# Patient Record
Sex: FEMALE | Race: OTHER | HISPANIC OR LATINO | URBAN - METROPOLITAN AREA
[De-identification: names, ages, dates, MRNs, and addresses within clinical notes are randomized per-mention and may not be internally consistent; named-entity substitution may affect disease eponyms.]

---

## 2017-08-30 ENCOUNTER — INPATIENT (INPATIENT)
Facility: HOSPITAL | Age: 77
LOS: 1 days | Discharge: HOME | DRG: 313 | End: 2017-09-01
Attending: INTERNAL MEDICINE | Admitting: INTERNAL MEDICINE
Payer: COMMERCIAL

## 2017-08-30 VITALS
RESPIRATION RATE: 18 BRPM | OXYGEN SATURATION: 98 % | SYSTOLIC BLOOD PRESSURE: 103 MMHG | WEIGHT: 136.03 LBS | TEMPERATURE: 99 F | HEIGHT: 60 IN | DIASTOLIC BLOOD PRESSURE: 53 MMHG | HEART RATE: 78 BPM

## 2017-08-30 DIAGNOSIS — Z90.49 ACQUIRED ABSENCE OF OTHER SPECIFIED PARTS OF DIGESTIVE TRACT: Chronic | ICD-10-CM

## 2017-08-30 DIAGNOSIS — R07.9 CHEST PAIN, UNSPECIFIED: ICD-10-CM

## 2017-08-30 LAB
ALBUMIN SERPL ELPH-MCNC: 2.4 G/DL — LOW (ref 3.5–5)
ALP SERPL-CCNC: 72 U/L — SIGNIFICANT CHANGE UP (ref 40–120)
ALT FLD-CCNC: 13 U/L DA — SIGNIFICANT CHANGE UP (ref 10–60)
ANION GAP SERPL CALC-SCNC: 5 MMOL/L — SIGNIFICANT CHANGE UP (ref 5–17)
AST SERPL-CCNC: 16 U/L — SIGNIFICANT CHANGE UP (ref 10–40)
BASOPHILS # BLD AUTO: 0.1 K/UL — SIGNIFICANT CHANGE UP (ref 0–0.2)
BASOPHILS NFR BLD AUTO: 0.9 % — SIGNIFICANT CHANGE UP (ref 0–2)
BILIRUB DIRECT SERPL-MCNC: 0.1 MG/DL — SIGNIFICANT CHANGE UP (ref 0–0.2)
BILIRUB INDIRECT FLD-MCNC: 0.4 MG/DL — SIGNIFICANT CHANGE UP (ref 0.2–1)
BILIRUB SERPL-MCNC: 0.5 MG/DL — SIGNIFICANT CHANGE UP (ref 0.2–1.2)
BUN SERPL-MCNC: 15 MG/DL — SIGNIFICANT CHANGE UP (ref 7–18)
CALCIUM SERPL-MCNC: 8.8 MG/DL — SIGNIFICANT CHANGE UP (ref 8.4–10.5)
CHLORIDE SERPL-SCNC: 100 MMOL/L — SIGNIFICANT CHANGE UP (ref 96–108)
CK MB BLD-MCNC: <1.8 % — SIGNIFICANT CHANGE UP (ref 0–3.5)
CK MB CFR SERPL CALC: <1 NG/ML — SIGNIFICANT CHANGE UP (ref 0–3.6)
CK SERPL-CCNC: 55 U/L — SIGNIFICANT CHANGE UP (ref 21–215)
CO2 SERPL-SCNC: 32 MMOL/L — HIGH (ref 22–31)
CREAT SERPL-MCNC: 0.73 MG/DL — SIGNIFICANT CHANGE UP (ref 0.5–1.3)
EOSINOPHIL # BLD AUTO: 0 K/UL — SIGNIFICANT CHANGE UP (ref 0–0.5)
EOSINOPHIL NFR BLD AUTO: 0.7 % — SIGNIFICANT CHANGE UP (ref 0–6)
GLUCOSE SERPL-MCNC: 146 MG/DL — HIGH (ref 70–99)
HCT VFR BLD CALC: 34.2 % — LOW (ref 34.5–45)
HGB BLD-MCNC: 10.9 G/DL — LOW (ref 11.5–15.5)
LYMPHOCYTES # BLD AUTO: 1.5 K/UL — SIGNIFICANT CHANGE UP (ref 1–3.3)
LYMPHOCYTES # BLD AUTO: 22.6 % — SIGNIFICANT CHANGE UP (ref 13–44)
MCHC RBC-ENTMCNC: 26.7 PG — LOW (ref 27–34)
MCHC RBC-ENTMCNC: 31.9 GM/DL — LOW (ref 32–36)
MCV RBC AUTO: 83.8 FL — SIGNIFICANT CHANGE UP (ref 80–100)
MONOCYTES # BLD AUTO: 0.7 K/UL — SIGNIFICANT CHANGE UP (ref 0–0.9)
MONOCYTES NFR BLD AUTO: 10.1 % — SIGNIFICANT CHANGE UP (ref 2–14)
NEUTROPHILS # BLD AUTO: 4.4 K/UL — SIGNIFICANT CHANGE UP (ref 1.8–7.4)
NEUTROPHILS NFR BLD AUTO: 65.7 % — SIGNIFICANT CHANGE UP (ref 43–77)
PLATELET # BLD AUTO: 204 K/UL — SIGNIFICANT CHANGE UP (ref 150–400)
POTASSIUM SERPL-MCNC: 4 MMOL/L — SIGNIFICANT CHANGE UP (ref 3.5–5.3)
POTASSIUM SERPL-SCNC: 4 MMOL/L — SIGNIFICANT CHANGE UP (ref 3.5–5.3)
PROT SERPL-MCNC: 6.4 G/DL — SIGNIFICANT CHANGE UP (ref 6–8.3)
RBC # BLD: 4.08 M/UL — SIGNIFICANT CHANGE UP (ref 3.8–5.2)
RBC # FLD: 13.6 % — SIGNIFICANT CHANGE UP (ref 10.3–14.5)
SODIUM SERPL-SCNC: 137 MMOL/L — SIGNIFICANT CHANGE UP (ref 135–145)
TROPONIN I SERPL-MCNC: <0.015 NG/ML — SIGNIFICANT CHANGE UP (ref 0–0.04)
WBC # BLD: 6.7 K/UL — SIGNIFICANT CHANGE UP (ref 3.8–10.5)
WBC # FLD AUTO: 6.7 K/UL — SIGNIFICANT CHANGE UP (ref 3.8–10.5)

## 2017-08-30 PROCEDURE — 71275 CT ANGIOGRAPHY CHEST: CPT | Mod: 26

## 2017-08-30 PROCEDURE — 71020: CPT | Mod: 26

## 2017-08-30 PROCEDURE — 99285 EMERGENCY DEPT VISIT HI MDM: CPT

## 2017-08-30 PROCEDURE — 76705 ECHO EXAM OF ABDOMEN: CPT | Mod: 26

## 2017-08-30 RX ORDER — SODIUM CHLORIDE 9 MG/ML
500 INJECTION INTRAMUSCULAR; INTRAVENOUS; SUBCUTANEOUS ONCE
Qty: 0 | Refills: 0 | Status: COMPLETED | OUTPATIENT
Start: 2017-08-30 | End: 2017-08-31

## 2017-08-30 RX ORDER — METOCLOPRAMIDE HCL 10 MG
10 TABLET ORAL ONCE
Qty: 0 | Refills: 0 | Status: COMPLETED | OUTPATIENT
Start: 2017-08-30 | End: 2017-08-30

## 2017-08-30 RX ORDER — ASPIRIN/CALCIUM CARB/MAGNESIUM 324 MG
325 TABLET ORAL ONCE
Qty: 0 | Refills: 0 | Status: COMPLETED | OUTPATIENT
Start: 2017-08-30 | End: 2017-08-30

## 2017-08-30 RX ORDER — SODIUM CHLORIDE 9 MG/ML
1000 INJECTION INTRAMUSCULAR; INTRAVENOUS; SUBCUTANEOUS ONCE
Qty: 0 | Refills: 0 | Status: COMPLETED | OUTPATIENT
Start: 2017-08-30 | End: 2017-08-30

## 2017-08-30 RX ADMIN — Medication 325 MILLIGRAM(S): at 17:32

## 2017-08-30 RX ADMIN — Medication 10 MILLIGRAM(S): at 17:32

## 2017-08-30 RX ADMIN — SODIUM CHLORIDE 2000 MILLILITER(S): 9 INJECTION INTRAMUSCULAR; INTRAVENOUS; SUBCUTANEOUS at 21:31

## 2017-08-30 NOTE — ED ADULT NURSE NOTE - OBJECTIVE STATEMENT
Pt is sent by the clinic  for chest pain , headaches , neck and shoulder pains  from last night. co sob. denies fever and chills

## 2017-08-30 NOTE — ED PROVIDER NOTE - OBJECTIVE STATEMENT
78 y/o F pt w/ PMHx of DM, GERD and HLD presents to ED c/o R shoulder pain radiating to the back, chest and neck since yesterday night. Pt took 3 Aspirin yesterday night and vomited it up. Pt also notes a slight cough. Pt states that she went to an Urgent Care today and was told to present to the ED. Pt denies headache, SOB, or any other complaints. Pt reports no recent trauma. NKDA.

## 2017-08-30 NOTE — ED PROVIDER NOTE - MEDICAL DECISION MAKING DETAILS
Patient with chest pain, back pain since last night, vomit x 2, c/o chest pain still. Normal ekg, trop, CT chest to r/o PE, admit for r/o ACS.

## 2017-08-31 DIAGNOSIS — R07.89 OTHER CHEST PAIN: ICD-10-CM

## 2017-08-31 DIAGNOSIS — Z29.9 ENCOUNTER FOR PROPHYLACTIC MEASURES, UNSPECIFIED: ICD-10-CM

## 2017-08-31 DIAGNOSIS — E11.9 TYPE 2 DIABETES MELLITUS WITHOUT COMPLICATIONS: ICD-10-CM

## 2017-08-31 LAB
CK MB BLD-MCNC: <2 % — SIGNIFICANT CHANGE UP (ref 0–3.5)
CK MB CFR SERPL CALC: <1 NG/ML — SIGNIFICANT CHANGE UP (ref 0–3.6)
CK SERPL-CCNC: 51 U/L — SIGNIFICANT CHANGE UP (ref 21–215)
HBA1C BLD-MCNC: 8 % — HIGH (ref 4–5.6)

## 2017-08-31 RX ORDER — ALENDRONATE SODIUM 70 MG/1
1 TABLET ORAL
Qty: 0 | Refills: 0 | COMMUNITY

## 2017-08-31 RX ORDER — ASPIRIN/CALCIUM CARB/MAGNESIUM 324 MG
81 TABLET ORAL DAILY
Qty: 0 | Refills: 0 | Status: DISCONTINUED | OUTPATIENT
Start: 2017-08-31 | End: 2017-09-01

## 2017-08-31 RX ORDER — ENOXAPARIN SODIUM 100 MG/ML
40 INJECTION SUBCUTANEOUS DAILY
Qty: 0 | Refills: 0 | Status: DISCONTINUED | OUTPATIENT
Start: 2017-08-31 | End: 2017-09-01

## 2017-08-31 RX ORDER — METFORMIN HYDROCHLORIDE 850 MG/1
1 TABLET ORAL
Qty: 0 | Refills: 0 | COMMUNITY

## 2017-08-31 RX ORDER — ATORVASTATIN CALCIUM 80 MG/1
40 TABLET, FILM COATED ORAL AT BEDTIME
Qty: 0 | Refills: 0 | Status: DISCONTINUED | OUTPATIENT
Start: 2017-08-31 | End: 2017-09-01

## 2017-08-31 RX ORDER — PREGABALIN 225 MG/1
1 CAPSULE ORAL
Qty: 0 | Refills: 0 | COMMUNITY

## 2017-08-31 RX ORDER — INSULIN LISPRO 100/ML
VIAL (ML) SUBCUTANEOUS
Qty: 0 | Refills: 0 | Status: DISCONTINUED | OUTPATIENT
Start: 2017-08-31 | End: 2017-09-01

## 2017-08-31 RX ORDER — GLIMEPIRIDE 1 MG
1 TABLET ORAL
Qty: 0 | Refills: 0 | COMMUNITY

## 2017-08-31 RX ADMIN — SODIUM CHLORIDE 500 MILLILITER(S): 9 INJECTION INTRAMUSCULAR; INTRAVENOUS; SUBCUTANEOUS at 00:32

## 2017-08-31 RX ADMIN — ATORVASTATIN CALCIUM 40 MILLIGRAM(S): 80 TABLET, FILM COATED ORAL at 21:57

## 2017-08-31 RX ADMIN — Medication 2: at 17:19

## 2017-08-31 RX ADMIN — Medication 81 MILLIGRAM(S): at 12:06

## 2017-08-31 RX ADMIN — ENOXAPARIN SODIUM 40 MILLIGRAM(S): 100 INJECTION SUBCUTANEOUS at 12:06

## 2017-08-31 RX ADMIN — Medication 1: at 12:05

## 2017-08-31 NOTE — H&P ADULT - NSHPLABSRESULTS_GEN_ALL_CORE
Vital Signs Last 24 Hrs  T(C): 36.6 (30 Aug 2017 23:55), Max: 37.2 (30 Aug 2017 16:37)  T(F): 97.9 (30 Aug 2017 23:55), Max: 98.9 (30 Aug 2017 16:37)  HR: 59 (30 Aug 2017 23:55) (59 - 78)  BP: 108/43 (30 Aug 2017 23:55) (94/50 - 108/43)  BP(mean): --  RR: 17 (30 Aug 2017 23:55) (17 - 18)  SpO2: 96% (30 Aug 2017 23:55) (96% - 98%)    - Xray Chest 2 Views PA/Lat (08.30.17 @ 17:41) >  Clear lungs. No mediastinal widening.     -CT Angio Chest w/ IV Cont (08.30.17 @ 19:58) >  No evidence for pulmonary embolism.  Cardiomegaly and small pericardial effusion.  Partially visualized upper abdomen demonstrates markedly dilated common   bile duct with intrahepatic bile duct dilatation. LFT correlation is   recommended and dedicated CT of the abdomen if not recently performed.    - US Hepatic & Pancreatic (08.30.17 @ 21:12) >  Dilated common bile duct reflecting postcholecystectomy state. Recommend   correlation with LFT and additional imaging (MRCP/MRI) if there is   clinical suspicion for choledocholithiasis. Vital Signs Last 24 Hrs  T(C): 36.6 (30 Aug 2017 23:55), Max: 37.2 (30 Aug 2017 16:37)  T(F): 97.9 (30 Aug 2017 23:55), Max: 98.9 (30 Aug 2017 16:37)  HR: 59 (30 Aug 2017 23:55) (59 - 78)  BP: 108/43 (30 Aug 2017 23:55) (94/50 - 108/43)  RR: 17 (30 Aug 2017 23:55) (17 - 18)  SpO2: 96% (30 Aug 2017 23:55) (96% - 98%)    - Xray Chest 2 Views PA/Lat (08.30.17 @ 17:41) >  Clear lungs. No mediastinal widening.     -CT Angio Chest w/ IV Cont (08.30.17 @ 19:58) >  No evidence for pulmonary embolism.  Cardiomegaly and small pericardial effusion.  Partially visualized upper abdomen demonstrates markedly dilated common   bile duct with intrahepatic bile duct dilatation. LFT correlation is   recommended and dedicated CT of the abdomen if not recently performed.    - US Hepatic & Pancreatic (08.30.17 @ 21:12) >  Dilated common bile duct reflecting postcholecystectomy state. Recommend   correlation with LFT and additional imaging (MRCP/MRI) if there is   clinical suspicion for choledocholithiasis.

## 2017-08-31 NOTE — CONSULT NOTE ADULT - SUBJECTIVE AND OBJECTIVE BOX
HISTORY OF PRESENT ILLNESS: HPI:  77 year old Female from home, ambulates independently, (daughter at beside interpreting) with PMH of diabetes, HLD??(patient stopped taking simvastatin in 2016) , osteoporosis??( patient takes alendronate but does not know why?) came with c/o Rt, shoulder pain, Chest pain and SOB x yesterday. As per the patient, She was coming out from car to go to her home when she suddenly developed Rt shoulder pain which then radiated to her chest and back of neck. Pain was sudden in onset, 9/10 in intensity, rodolfo in nature, with no aggravating/ alleviating factors, no changes with movement, associated with SOB, coughing, nausea, vomited 2 times which contains yellow fluid. She denies dizziness, diaphoresis, trauma, anxiety, stress, nasal congestion, hx of URI, orthopnea, Pedal edema, palpitations, heartburn, weight loss, abdominal pain, urinary or Bowel complains. She then went to her PCP Dr. Mau Horton (ph # [phone]) who referred the patient to hospital.  Pain is relieved in ED after she got reglan.  Patient also denies hx of GERD or gastritis, Her symptoms are not clearly exertional.  Symptoms currently resolved    In ED: Patient was given Reglan, aspirin, EKG was normal NSR, No ST-T wave changes. Troponin x 3 normal. When Patient was seen by me, She was asymptomatic. (31 Aug 2017 01:25)      PAST MEDICAL & SURGICAL HISTORY:  DM (diabetes mellitus)  S/P appendectomy  History of cholecystectomy          MEDICATIONS:  MEDICATIONS  (STANDING):  insulin lispro (HumaLOG) corrective regimen sliding scale   SubCutaneous three times a day before meals  enoxaparin Injectable 40 milliGRAM(s) SubCutaneous daily  atorvastatin 40 milliGRAM(s) Oral at bedtime  aspirin  chewable 81 milliGRAM(s) Oral daily      Allergies    penicillin (Unknown)    Intolerances        FAMILY HISTORY:  Family history of lymphoma (Sibling)  Family history of diabetes mellitus (DM) (Sibling): Brother has diabetes    Non-contributary for premature coronary disease or sudden cardiac death    SOCIAL HISTORY:    [X ] Non-smoker  [ ] Smoker  [ ] Alcohol      REVIEW OF SYSTEMS:  [ X]chest pain  [  ]shortness of breath  [  ]palpitations  [  ]syncope  [ ]near syncope [ ]upper extremity weakness   [ ] lower extremity weakness  [  ]diplopia  [  ]altered mental status   [  ]fevers  [ ]chills [ ]nausea  [ ]vomitting  [  ]dysphagia    [ ]abdominal pain  [ ]melena  [ ]BRBPR    [  ]epistaxis  [  ]rash    [ ]lower extremity edema        [ X] All others negative	  [ ] Unable to obtain    PHYSICAL EXAM:  T(C): 36.7 (08-31-17 @ 07:20), Max: 37.2 (08-30-17 @ 16:37)  HR: 65 (08-31-17 @ 07:20) (59 - 78)  BP: 110/49 (08-31-17 @ 07:20) (94/50 - 110/49)  RR: 16 (08-31-17 @ 07:20) (16 - 18)  SpO2: 95% (08-31-17 @ 07:20) (95% - 98%)  Wt(kg): --  I&O's Summary    31 Aug 2017 07:01  -  31 Aug 2017 10:44  --------------------------------------------------------  IN: 200 mL / OUT: 0 mL / NET: 200 mL          HEENT:   Normal oral mucosa, PERRL, EOMI	  Lymphatic: No obvious lymphadenopathy , no edema  Cardiovascular: Normal S1 S2, No JVD,  1/6 KAPIL murmur , Peripheral pulses palpable 2+ bilaterally  Respiratory: Lungs clear to auscultation, normal effort 	  Gastrointestinal:  Soft, Non-tender, + BS	  Skin: No rashes, No cyanosis, warm to touch  Musculoskeletal: Normal range of motion, normal strength  Psychiatry:  Appropriate Mood & affect     TELEMETRY: 	  Sinus  ECG:  	Sinus 73 BPM, possible LAE  RADIOLOGY:       CXR: (-) infiltrates    	  	  LABS:	 	    CARDIAC MARKERS:  CARDIAC MARKERS ( 30 Aug 2017 23:20 )  <0.015 ng/mL / x     / 51 U/L / x     / <1.0 ng/mL  CARDIAC MARKERS ( 30 Aug 2017 20:48 )  <0.015 ng/mL / x     / x     / x     / x      CARDIAC MARKERS ( 30 Aug 2017 17:28 )  <0.015 ng/mL / x     / 55 U/L / x     / <1.0 ng/mL                              10.9   6.7   )-----------( 204      ( 30 Aug 2017 17:28 )             34.2     Hb Trend: 10.9<--    08-30    137  |  100  |  15  ----------------------------<  146<H>  4.0   |  32<H>  |  0.73    Ca    8.8      30 Aug 2017 17:28    TPro  6.4  /  Alb  2.4<L>  /  TBili  0.5  /  DBili  0.1  /  AST  16  /  ALT  13  /  AlkPhos  72  08-30    Creatinine Trend: 0.73<--    Coags:      proBNP:   Lipid Profile:   HgA1c: Hemoglobin A1C, Whole Blood: 8.0 % (08-31 @ 10:13)    TSH:     ASSESSMENT/PLAN: 	77y Female DM, admitted with atypical CP r/o for MI    - Prelim echo with small pericardial effusion, check TSH  - F/u official echo report  - F/u nuclear stress test today    I once again thank you for allowing me to participate in the care of your patient.  If you have any questions or concerns please do not hesitate to contact me.    Talat Velasco MD, FACC  [facility]  [address] [place] [address]  PHONE:  [phone]  BEEPER : [phone]

## 2017-08-31 NOTE — H&P ADULT - PROBLEM SELECTOR PLAN 2
FS: 110  -Patient take metformin, insulin and glimepride  -Will start HSS, c/w accucheck FS: 110  -Patient take metformin, insulin and glimepride at home  -Will start HSS, c/w accucheck  -f/u HbA1C FS: 110  -Patient take metformin, insulin and glimepride at home  -Will start HSS, c/w accucheck  -f/u HbA1C  -Patient doesn't remember dose of insulin degludec, as per ED it is 16U,  Primary medical team please call pharmacy in morning (ph# [phone]) to confirm medications

## 2017-08-31 NOTE — H&P ADULT - FAMILY HISTORY
Sibling  Still living? Unknown  Family history of diabetes mellitus (DM), Age at diagnosis: Age Unknown  Family history of lymphoma, Age at diagnosis: Age Unknown

## 2017-08-31 NOTE — H&P ADULT - ASSESSMENT
77 year old Female from home, ambulates independently, (daughter at beside interpreting) with PMH of diabetes came with c/o Rt, shoulder pain, Chest pain and SOB x yesterday. As per the patient, She was coming out from car to go to her home when she suddenly developed Rt shoulder pain which then radiated to her chest and back of neck. Pain was sudden in onset, 9/10 in intensity, rodolfo in nature, with no aggravating/ alleviating factors, no changes with movement, associated with SOB, coughing, nausea, vomited 2 times which contains yellow fluid. She denies dizziness, diaphoresis, trauma, anxiety, stress, nasal congestion, hx of URI, orthopnea, Pedal edema, palpitations, heartburn, weight loss, abdominal pain, urinary or Bowel complains. She then went to her PCP Dr. Mau Horton (ph # [phone]) who referred the patient to hospital.  Pain is relieved in ED after she got reglan.  Patient also denies hx of GERD or gastritis,     In ED: Patient was given Reglan, aspirin, EKG was normal NSR, No ST-T wave changes. Troponin x 3 normal. When Patient was seen by me, She was asymptomatic.  Given the patient history of diabetes and age, patient is admitted to Telemetry floor to R/o MI/ arrythmias.

## 2017-08-31 NOTE — H&P ADULT - PROBLEM SELECTOR PLAN 1
Atypical chest pain, R/O MI/ ACS: EKG: NSR, NS-T wave changes, Troponin x 3 negative., Heart score: Moderate- 4, ELINA: 1 point( 5% all cause mortality risk),   -Patient states she was on simvastatin till 2016 but her PCP discontinued her  -PE ruled out: CT angio normal,  -CXR normal  --F/u Echo, F/u Lipid profile Atypical chest pain, Given patient hx of diabetes will r/o MI/ ACS: EKG: NSR, NS-T wave changes, Troponin x 3 negative., Heart score: Moderate- 4, ELINA: 1 point( 5% all cause mortality risk), PE ruled out: CT angio normal, CXR normal  -Patient states she was on simvastatin till 2016 as her PCP asked to stop the medication  -will start aspirin, atorvastatin   -f/u lipid, f/u Echo Atypical chest pain, Given patient hx of diabetes will r/o MI/ ACS: EKG: NSR, NS-T wave changes, Troponin x 3 negative., Heart score: Moderate- 4, ELINA: 1 point( 5% all cause mortality risk), PE ruled out: CT angio normal, CXR normal  -Patient states she was on simvastatin till 2016 but her PCP asked to stop the medication  -will start aspirin, atorvastatin   -f/u lipid, f/u Echo Atypical chest pain, Given patient hx of diabetes will r/o MI/ ACS: EKG: NSR, NS-T wave changes, Troponin x 3 negative.,  Heart score: Moderate- 4, ELINA: 1 point( 5% all cause mortality risk), PE ruled out: CT angio normal, Partially visualized upper abdomen demonstrates markedly dilated common bile duct with intrahepatic bile duct dilatation. could be due to postcholecytectomy finding"  CXR normal  -Patient states she was on simvastatin till 2016 but her PCP asked to stop the medication  -will start aspirin, atorvastatin, not starting lopressor because of low BP  -f/u lipid, f/u Echo Atypical chest pain, Given patient hx of diabetes will r/o MI/ ACS: EKG: NSR, NS-T wave changes, Troponin x 3 negative.,  Heart score: Moderate- 4, ELINA: 1 point( 5% all cause mortality risk), PE ruled out: CT angio normal, Partially visualized upper abdomen demonstrates markedly dilated common bile duct with intrahepatic bile duct dilatation. could be due to postcholecytectomy finding"  CXR normal  -Patient states she was on simvastatin till 2016 but her PCP asked to stop the medication  -will start aspirin, atorvastatin, not starting lopressor because of low BP  -f/u lipid, f/u Echo  -Cardio consult:

## 2017-08-31 NOTE — H&P ADULT - HISTORY OF PRESENT ILLNESS
77 year old Female from home, ambulates independently, (daughter at beside interpreting) with PMH of diabetes came with c/o Rt, shoulder pain, Chest pain and SOB x yesterday. As per the patient, She was coming out from car to go to her home when she suddenly developed Rt shoulder pain which then radiated to her chest and back of neck. Pain was sudden in onset, 9/10 in intensity, rodolfo in nature, with no aggravating/ alleviating factors, no changes with movement, associated with SOB, coughing, nausea, vomited 2 times which contains yellow fluid. She denies dizziness, diaphoresis, trauma, anxiety, stress, nasal congestion, hx of URI, orthopnea, Pedal edema, palpitations, heartburn, weight loss, abdominal pain, urinary or Bowel complains. She then went to her PCP Dr. Mau Horton (ph # [phone]) who referred the patient to hospital.  Pain is relieved in ED after she got reglan.  Patient also denies hx of GERD or gastritis,     In ED: Patient was given Reglan, aspirin, EKG was normal NSR, No ST-T wave changes. Troponin x 3 normal. When Patient was seen by me, She was asymptomatic. 77 year old Female from home, ambulates independently, (daughter at beside interpreting) with PMH of diabetes, HLD??(patient stopped taking simvastatin in 2016) , osteoporosis??( patient takes alendronate but does not know why?) came with c/o Rt, shoulder pain, Chest pain and SOB x yesterday. As per the patient, She was coming out from car to go to her home when she suddenly developed Rt shoulder pain which then radiated to her chest and back of neck. Pain was sudden in onset, 9/10 in intensity, rodolfo in nature, with no aggravating/ alleviating factors, no changes with movement, associated with SOB, coughing, nausea, vomited 2 times which contains yellow fluid. She denies dizziness, diaphoresis, trauma, anxiety, stress, nasal congestion, hx of URI, orthopnea, Pedal edema, palpitations, heartburn, weight loss, abdominal pain, urinary or Bowel complains. She then went to her PCP Dr. Mau Horton (ph # [phone]) who referred the patient to hospital.  Pain is relieved in ED after she got reglan.  Patient also denies hx of GERD or gastritis,     In ED: Patient was given Reglan, aspirin, EKG was normal NSR, No ST-T wave changes. Troponin x 3 normal. When Patient was seen by me, She was asymptomatic.

## 2017-08-31 NOTE — H&P ADULT - PROBLEM SELECTOR PLAN 3
IMPROVE score: 2 will start lovenox Hb10.9 MCV and RDW nl.  f/u anemia panel Hb10.9 MCV and RDW nl. Patient also takes cyanocobalamine injections.  f/u B12,Folic acid  f/u anemia panel

## 2017-08-31 NOTE — H&P ADULT - ATTENDING COMMENTS
Patient seen and examined; Agree with PGY1 A/P above with editing as needed.    77 year old Female from home, ambulates independently, with PMH of diabetes, HLD??(patient stopped taking simvastatin in 2016) , osteoporosis??( patient takes alendronate but does not know why?) came with c/o Rt, shoulder pain, Chest pain and SOB x yesterday. As per the patient, She was coming out from car to go to her home when she suddenly developed Rt shoulder pain which then radiated to her chest and back of neck. Pain was sudden in onset, 9/10 in intensity, rodolfo in nature, with no aggravating/ alleviating factors, no changes with movement, associated with SOB, coughing, nausea, vomited 2 times which contains yellow fluid. She denies dizziness, diaphoresis, trauma, anxiety, stress, nasal congestion, hx of URI, orthopnea, Pedal edema, palpitations, heartburn, weight loss, abdominal pain, urinary or Bowel complains. She then went to her PCP Dr. Mau Horton (ph # [phone]) who referred the patient to hospital.  Pain is relieved in ED after she got reglan.  Patient also denies hx of GERD or gastritis,     Patient feels better this morning; symptoms resolved; No further vomiting; Ate lunch; tolerated; Other ROS negative    FH: DM (Son); No HTN or stroke or kidney problem  SH: Non smoker; No Alcohol; Lives with daughter      Vital Signs Last 24 Hrs: Stable  T(C): 37.2 (31 Aug 2017 11:35), Max: 37.2 (30 Aug 2017 16:37)  T(F): 98.9 (31 Aug 2017 11:35), Max: 98.9 (30 Aug 2017 16:37)  HR: 72 (31 Aug 2017 11:35) (59 - 78)  BP: 123/42 (31 Aug 2017 11:35) (94/50 - 123/42)  RR: 16 (31 Aug 2017 11:35) (16 - 18)  SpO2: 98% (31 Aug 2017 11:35) (95% - 98%)    P/E: As above; Clinically NAD    labs: Reviewed; WNL     CT Angio Chest w/ IV Cont (08.30.17 @ 19:58)     IMPRESSION:     No evidence for pulmonary embolism.    Cardiomegaly and small pericardial effusion.    Partially visualized upper abdomen demonstrates markedly dilated common bile duct with intrahepatic bile duct dilatation. LFT correlation is   recommended and dedicated CT of the abdomen if not recently performed.    D/D:  Chest pain concern for ACS/ ischemic heart disease given risk factors (DM, age)  VTE ruled out  Possible Esophagitis sec. to Bisphosphonate  Evaluate for heart failure given cardiomegaly    Plan:  Admit to tele; Echo f/u results; Ecotrin  Serial troponin negative;   Cardio consult appreciated; F/U Stress and Echo results  Hold anti HTN meds due to low normal BP Patient seen and examined; Agree with PGY1 A/P above with editing as needed.    77 year old Female from home, ambulates independently, with PMH of diabetes, HLD??(patient stopped taking simvastatin in 2016) , osteoporosis??( patient takes alendronate but does not know why?) came with c/o Rt, shoulder pain, Chest pain and SOB x yesterday. As per the patient, She was coming out from car to go to her home when she suddenly developed Rt shoulder pain which then radiated to her chest and back of neck. Pain was sudden in onset, 9/10 in intensity, rodolfo in nature, with no aggravating/ alleviating factors, no changes with movement, associated with SOB, coughing, nausea, vomited 2 times which contains yellow fluid. She denies dizziness, diaphoresis, trauma, anxiety, stress, nasal congestion, hx of URI, orthopnea, Pedal edema, palpitations, heartburn, weight loss, abdominal pain, urinary or Bowel complains. She then went to her PCP Dr. Mau Horton (ph # [phone]) who referred the patient to hospital.  Pain is relieved in ED after she got reglan.  Patient also denies hx of GERD or gastritis,     Patient feels better this morning; symptoms resolved; No further vomiting; Ate lunch; tolerated; Other ROS negative    FH: DM (Son); No HTN or stroke or kidney problem  SH: Non smoker; No Alcohol; Lives with daughter      Vital Signs Last 24 Hrs: Stable  T(C): 37.2 (31 Aug 2017 11:35), Max: 37.2 (30 Aug 2017 16:37)  T(F): 98.9 (31 Aug 2017 11:35), Max: 98.9 (30 Aug 2017 16:37)  HR: 72 (31 Aug 2017 11:35) (59 - 78)  BP: 123/42 (31 Aug 2017 11:35) (94/50 - 123/42)  RR: 16 (31 Aug 2017 11:35) (16 - 18)  SpO2: 98% (31 Aug 2017 11:35) (95% - 98%)    P/E: As above; Clinically NAD    labs: Reviewed; WNL     CT Angio Chest w/ IV Cont (08.30.17 @ 19:58)     IMPRESSION:     No evidence for pulmonary embolism.    Cardiomegaly and small pericardial effusion.    Partially visualized upper abdomen demonstrates markedly dilated common bile duct with intrahepatic bile duct dilatation. LFT correlation is   recommended and dedicated CT of the abdomen if not recently performed.    D/D:  Chest pain concern for ACS/ ischemic heart disease given risk factors (DM, age)  VTE ruled out  Possible Esophagitis sec. to Bisphosphonate  Evaluate for heart failure given cardiomegaly  Uncontrolled DM  Anemia chronic likely    Plan:  Admit to tele; Echo f/u results; Ecotrin  Serial troponin negative;   Cardio consult appreciated; F/U Stress and Echo results  Hold anti HTN meds due to low normal BP  Needs better control of DM  will give PPI for possible Esophagitis    Discussed with patient and family at bedside findings and plan of care  Discussed with MAR last night

## 2017-09-01 ENCOUNTER — TRANSCRIPTION ENCOUNTER (OUTPATIENT)
Age: 77
End: 2017-09-01

## 2017-09-01 VITALS
HEART RATE: 65 BPM | SYSTOLIC BLOOD PRESSURE: 119 MMHG | DIASTOLIC BLOOD PRESSURE: 36 MMHG | RESPIRATION RATE: 16 BRPM | TEMPERATURE: 99 F | OXYGEN SATURATION: 96 %

## 2017-09-01 LAB
ANION GAP SERPL CALC-SCNC: 9 MMOL/L — SIGNIFICANT CHANGE UP (ref 5–17)
BUN SERPL-MCNC: 9 MG/DL — SIGNIFICANT CHANGE UP (ref 7–18)
CALCIUM SERPL-MCNC: 9.2 MG/DL — SIGNIFICANT CHANGE UP (ref 8.4–10.5)
CHLORIDE SERPL-SCNC: 104 MMOL/L — SIGNIFICANT CHANGE UP (ref 96–108)
CHOLEST SERPL-MCNC: 190 MG/DL — SIGNIFICANT CHANGE UP (ref 10–199)
CO2 SERPL-SCNC: 28 MMOL/L — SIGNIFICANT CHANGE UP (ref 22–31)
CREAT SERPL-MCNC: 0.74 MG/DL — SIGNIFICANT CHANGE UP (ref 0.5–1.3)
FERRITIN SERPL-MCNC: 159 NG/ML — HIGH (ref 15–150)
GLUCOSE SERPL-MCNC: 191 MG/DL — HIGH (ref 70–99)
HBA1C BLD-MCNC: 8 % — HIGH (ref 4–5.6)
HCT VFR BLD CALC: 37.3 % — SIGNIFICANT CHANGE UP (ref 34.5–45)
HDLC SERPL-MCNC: 69 MG/DL — SIGNIFICANT CHANGE UP (ref 40–125)
HGB BLD-MCNC: 12.5 G/DL — SIGNIFICANT CHANGE UP (ref 11.5–15.5)
IRON SATN MFR SERPL: 15 % — SIGNIFICANT CHANGE UP (ref 15–50)
IRON SATN MFR SERPL: 43 UG/DL — SIGNIFICANT CHANGE UP (ref 40–150)
LIPID PNL WITH DIRECT LDL SERPL: 102 MG/DL — SIGNIFICANT CHANGE UP
MAGNESIUM SERPL-MCNC: 2 MG/DL — SIGNIFICANT CHANGE UP (ref 1.6–2.6)
MCHC RBC-ENTMCNC: 28.8 PG — SIGNIFICANT CHANGE UP (ref 27–34)
MCHC RBC-ENTMCNC: 33.5 GM/DL — SIGNIFICANT CHANGE UP (ref 32–36)
MCV RBC AUTO: 86 FL — SIGNIFICANT CHANGE UP (ref 80–100)
PHOSPHATE SERPL-MCNC: 2.7 MG/DL — SIGNIFICANT CHANGE UP (ref 2.5–4.5)
PLATELET # BLD AUTO: 229 K/UL — SIGNIFICANT CHANGE UP (ref 150–400)
POTASSIUM SERPL-MCNC: 3.8 MMOL/L — SIGNIFICANT CHANGE UP (ref 3.5–5.3)
POTASSIUM SERPL-SCNC: 3.8 MMOL/L — SIGNIFICANT CHANGE UP (ref 3.5–5.3)
RBC # BLD: 4.34 M/UL — SIGNIFICANT CHANGE UP (ref 3.8–5.2)
RBC # FLD: 13.6 % — SIGNIFICANT CHANGE UP (ref 10.3–14.5)
SODIUM SERPL-SCNC: 141 MMOL/L — SIGNIFICANT CHANGE UP (ref 135–145)
TIBC SERPL-MCNC: 283 UG/DL — SIGNIFICANT CHANGE UP (ref 250–450)
TOTAL CHOLESTEROL/HDL RATIO MEASUREMENT: 2.8 RATIO — LOW (ref 3.3–7.1)
TRANSFERRIN SERPL-MCNC: 235 MG/DL — SIGNIFICANT CHANGE UP (ref 200–360)
TRIGL SERPL-MCNC: 97 MG/DL — SIGNIFICANT CHANGE UP (ref 10–149)
TSH SERPL-MCNC: 1.73 UU/ML — SIGNIFICANT CHANGE UP (ref 0.34–4.82)
UIBC SERPL-MCNC: 240 UG/DL — SIGNIFICANT CHANGE UP (ref 110–370)
VIT B12 SERPL-MCNC: 425 PG/ML — SIGNIFICANT CHANGE UP (ref 243–894)
WBC # BLD: 6.2 K/UL — SIGNIFICANT CHANGE UP (ref 3.8–10.5)
WBC # FLD AUTO: 6.2 K/UL — SIGNIFICANT CHANGE UP (ref 3.8–10.5)

## 2017-09-01 RX ORDER — ASPIRIN/CALCIUM CARB/MAGNESIUM 324 MG
1 TABLET ORAL
Qty: 30 | Refills: 0 | OUTPATIENT
Start: 2017-09-01 | End: 2017-10-01

## 2017-09-01 RX ORDER — INSULIN DEGLUDEC 100 U/ML
15 INJECTION, SOLUTION SUBCUTANEOUS
Qty: 0 | Refills: 0 | COMMUNITY

## 2017-09-01 RX ORDER — ATORVASTATIN CALCIUM 80 MG/1
1 TABLET, FILM COATED ORAL
Qty: 30 | Refills: 0 | OUTPATIENT
Start: 2017-09-01 | End: 2017-10-01

## 2017-09-01 RX ORDER — INSULIN DEGLUDEC 100 U/ML
16 INJECTION, SOLUTION SUBCUTANEOUS
Qty: 0 | Refills: 0 | COMMUNITY

## 2017-09-01 RX ORDER — INSULIN DEGLUDEC 100 U/ML
20 INJECTION, SOLUTION SUBCUTANEOUS
Qty: 0 | Refills: 0 | COMMUNITY

## 2017-09-01 RX ADMIN — ENOXAPARIN SODIUM 40 MILLIGRAM(S): 100 INJECTION SUBCUTANEOUS at 12:00

## 2017-09-01 RX ADMIN — Medication 81 MILLIGRAM(S): at 12:00

## 2017-09-01 RX ADMIN — Medication 3: at 12:00

## 2017-09-01 RX ADMIN — Medication 1: at 08:11

## 2017-09-01 NOTE — DISCHARGE NOTE ADULT - MEDICATION SUMMARY - MEDICATIONS TO TAKE
I will START or STAY ON the medications listed below when I get home from the hospital:    aspirin 81 mg oral tablet, chewable  -- 1 tab(s) by mouth once a day  -- Indication: For Cardioprotective    insulin degludec 100 units/mL subcutaneous solution  -- 20 unit(s) subcutaneous once a day (at bedtime)  -- Indication: For DM (diabetes mellitus)    glimepiride 4 mg oral tablet  -- 1 tab(s) by mouth once a day  -- Indication: For DM (diabetes mellitus)    metFORMIN 850 mg oral tablet  -- 1 tab(s) by mouth 2 times a day (with meals)  -- Indication: For DM (diabetes mellitus)    atorvastatin 40 mg oral tablet  -- 1 tab(s) by mouth once a day (at bedtime)  -- Indication: For Hld    alendronate 70 mg oral tablet  -- 1 tab(s) by mouth once a week  -- Indication: For osteoporosis    cyanocobalamin 1000 mcg/mL injectable solution  -- 1 milliliter(s) injectable once a month  -- Indication: For vitmain defieciency I will START or STAY ON the medications listed below when I get home from the hospital:    aspirin 81 mg oral tablet, chewable  -- 1 tab(s) by mouth once a day  -- Indication: For Cardioprotective     glimepiride 4 mg oral tablet  -- 1 tab(s) by mouth once a day  -- Indication: For DM (diabetes mellitus)    metFORMIN 850 mg oral tablet  -- 1 tab(s) by mouth 2 times a day (with meals)  -- Indication: For DM (diabetes mellitus)    insulin degludec 100 units/mL subcutaneous solution  -- 20 unit(s) subcutaneous once a day (at bedtime)  -- Indication: For DM (diabetes mellitus)    alendronate 70 mg oral tablet  -- 1 tab(s) by mouth once a week  -- Indication: For osteoporosis    cyanocobalamin 1000 mcg/mL injectable solution  -- 1 milliliter(s) injectable once a month  -- Indication: For vitmain defieciency I will START or STAY ON the medications listed below when I get home from the hospital:    aspirin 81 mg oral tablet, chewable  -- 1 tab(s) by mouth once a day  -- Indication: For Cardioprotective     insulin degludec 100 units/mL subcutaneous solution  -- 15 unit(s) subcutaneous once a day (at bedtime)  -- Indication: For DM (diabetes mellitus)    glimepiride 4 mg oral tablet  -- 1 tab(s) by mouth once a day  -- Indication: For DM (diabetes mellitus)    metFORMIN 850 mg oral tablet  -- 1 tab(s) by mouth 2 times a day (with meals)  -- Indication: For DM (diabetes mellitus)    atorvastatin 20 mg oral tablet  -- 1 tab(s) by mouth once a day  -- Avoid grapefruit and grapefruit juice while taking this medication.  Do not take this drug if you are pregnant.  It is very important that you take or use this exactly as directed.  Do not skip doses or discontinue unless directed by your doctor.  Obtain medical advice before taking any non-prescription drugs as some may affect the action of this medication.  Take with food or milk.    -- Indication: For Cardioprotective    alendronate 70 mg oral tablet  -- 1 tab(s) by mouth once a week  -- Indication: For osteoporosis    cyanocobalamin 1000 mcg/mL injectable solution  -- 1 milliliter(s) injectable once a month  -- Indication: For vitmain defieciency I will START or STAY ON the medications listed below when I get home from the hospital:    aspirin 81 mg oral tablet, chewable  -- 1 tab(s) by mouth once a day  -- Indication: For Cardioprotective     insulin degludec 100 units/mL subcutaneous solution  -- 15 unit(s) subcutaneous once a day (at bedtime)  -- Indication: For DM (diabetes mellitus)    glimepiride 4 mg oral tablet  -- 1 tab(s) by mouth once a day  -- Indication: For DM (diabetes mellitus)    metFORMIN 850 mg oral tablet  -- 1 tab(s) by mouth 2 times a day (with meals)  -- Indication: For DM (diabetes mellitus)    atorvastatin 10 mg oral tablet  -- 1 tab(s) by mouth once a day at bedtime  -- Avoid grapefruit and grapefruit juice while taking this medication.  Do not take this drug if you are pregnant.  It is very important that you take or use this exactly as directed.  Do not skip doses or discontinue unless directed by your doctor.  Obtain medical advice before taking any non-prescription drugs as some may affect the action of this medication.  Take with food or milk.    -- Indication: For Prevent heart disease    alendronate 70 mg oral tablet  -- 1 tab(s) by mouth once a week  -- Indication: For osteoporosis    cyanocobalamin 1000 mcg/mL injectable solution  -- 1 milliliter(s) injectable once a month  -- Indication: For vitmain defieciency I will START or STAY ON the medications listed below when I get home from the hospital:    aspirin 81 mg oral tablet, chewable  -- 1 tab(s) by mouth once a day  -- Indication: For Cardioproptective    glimepiride 4 mg oral tablet  -- 1 tab(s) by mouth once a day  -- Indication: For DM (diabetes mellitus)    metFORMIN 850 mg oral tablet  -- 1 tab(s) by mouth 2 times a day (with meals)  -- Indication: For DM (diabetes mellitus)    insulin degludec 100 units/mL subcutaneous solution  -- 15 unit(s) subcutaneous once a day (at bedtime)  -- Indication: For DM (diabetes mellitus)    atorvastatin 10 mg oral tablet  -- 1 tab(s) by mouth once a day at bedtime  -- Avoid grapefruit and grapefruit juice while taking this medication.  Do not take this drug if you are pregnant.  It is very important that you take or use this exactly as directed.  Do not skip doses or discontinue unless directed by your doctor.  Obtain medical advice before taking any non-prescription drugs as some may affect the action of this medication.  Take with food or milk.    -- Indication: For Cardioprotective    alendronate 70 mg oral tablet  -- 1 tab(s) by mouth once a week  -- Indication: For osteoporosis    cyanocobalamin 1000 mcg/mL injectable solution  -- 1 milliliter(s) injectable once a month  -- Indication: For vitmain defieciency

## 2017-09-01 NOTE — DISCHARGE NOTE ADULT - PLAN OF CARE
resolution of symptoms c/w asa   c/w home meds   Please c/w low ch diet   Follow up with pmd in 2 weeks c/w home meds  c/w Increased dose to 20 at bedtime c/w vitamin b 12 and folic acid c/w home meds  c/w Increased dose to 20 at bedtime  a1c :8   Please be compliant with medications and diet and exercise   Follow up with pmd in 1 week  Please get check up with eye doctor (ophthalmologist ) and foot doctor ( podiatry ) - owing to the complications that can arise from diabetes c/w asa , statin (started after ascertaining the ascvd risk )  c/w home meds   Please c/w low ch diet   Follow up with pmd in 2 weeks c/w home meds  c/w Increased dose to 15 at bedtime  a1c :8   Please be compliant with medications and diet and exercise   Follow up with pmd in 1 week  Please get check up with eye doctor (ophthalmologist ) and foot doctor ( podiatry ) - owing to the complications that can arise from diabetes Target HgbA1c around 7.0 Keep hgb more than 12 gm. You presented with chest pain and given risk factors for heart disease, you had a stress test which was negative for Ischemia. Echocardiogram did not show any significant valvular pathology showed small pericardial effusion. Given Diabetes, will recommend a baby Aspirin and your risk of heart disease based on ASCVD risk will benefit from Moderate intensity statin Lipitor 10 mg at bedtime) although your cholesterol is normal    c/w home meds   Please c/w low ch diet   Follow up with pmd in 2 weeks Your HgbA1c currently 8.0; As per patient previously 11;   Continue home dose of Glimepiride and Metformin; Increased insulin dose to 15 at bedtime  Follow jup with Accuchecks and PCP as outpatient.  Please be compliant with medications and diet and exercise   Please get check up with eye doctor (ophthalmologist ) and foot doctor ( podiatry ) - owing to the complications that can arise from diabetes Likely changes post removal of Gall bladder. You do not have any symptoms at this time. Chest pain could be attribued to acid reflux; Advised to avoid spicy and oily food and eat dinner at least 2 hrs before sleep. If recurrent symptoms, may consider Omeprazole over the counter and if recurrent or persistent chest pain after trial of Omeprazole, may consult Gastroenterologist for Endoscopy.

## 2017-09-01 NOTE — DISCHARGE NOTE ADULT - MEDICATION SUMMARY - MEDICATIONS TO CHANGE
I will SWITCH the dose or number of times a day I take the medications listed below when I get home from the hospital:    insulin degludec 100 units/mL subcutaneous solution  -- 16 unit(s) subcutaneous once a day (at bedtime)

## 2017-09-01 NOTE — CHART NOTE - NSCHARTNOTEFT_GEN_A_CORE
Patient seen and examined this morning; Denies any complaints;    Vital Signs Last 24 Hrs  T(C): 37.1 (01 Sep 2017 11:35), Max: 37.1 (01 Sep 2017 11:35)  T(F): 98.8 (01 Sep 2017 11:35), Max: 98.8 (01 Sep 2017 11:35)  HR: 65 (01 Sep 2017 11:35) (65 - 79)  BP: 119/36 (01 Sep 2017 11:35) (101/45 - 125/54)  RR: 16 (01 Sep 2017 11:35) (16 - 18)  SpO2: 96% (01 Sep 2017 11:35) (95% - 100%)    P/E: Unchanged    labs: Stable CBC and BMP    A?P  Chest pain likely atypical

## 2017-09-01 NOTE — DISCHARGE NOTE ADULT - HOSPITAL COURSE
77 year old Female from home, ambulates independently, (daughter at beside interpreting) with PMH of diabetes, HLD??(patient stopped taking simvastatin in 2016) , osteoporosis??( patient takes alendronate but does not know why?) came with c/o Rt, shoulder pain, Chest pain and SOB x yesterday. As per the patient, She was coming out from car to go to her home when she suddenly developed Rt shoulder pain which then radiated to her chest and back of neck. Pain was sudden in onset, 9/10 in intensity, rodolfo in nature, with no aggravating/ alleviating factors, no changes with movement, associated with SOB, coughing, nausea, vomited 2 times which contains yellow fluid. She denies dizziness, diaphoresis, trauma, anxiety, stress, nasal congestion, hx of URI, orthopnea, Pedal edema, palpitations, heartburn, weight loss, abdominal pain, urinary or Bowel complains. She then went to her PCP Dr. Mau Horton (ph # [phone]) who referred the patient to hospital.  Pain is relieved in ED after she got reglan.  Patient also denies hx of GERD or gastritis,     Patient was admitted to tele for atypical chest pain  to rule out acs .   EKG: NSR, NS-T wave changes, Troponin x 3 negative.,  Heart score: Moderate- 4, ELINA: 1 point( 5% all cause mortality risk), PE ruled out: CT angio normal, Partially visualized upper abdomen demonstrates markedly dilated common bile duct with intrahepatic bile duct dilatation. could be due to postcholecytectomy finding"  CXR normal  -Patient states she was on simvastatin till 2016 but her PCP asked to stop the medication  -will start aspirin, atorvastatin, not starting lopressor because of low BP  -f/u lipid profile : wnl    f/u Echo : Trace pericardial effusion measuring approximatelt 0.5  cm posterior to the left ventricle.   No echocardiographic  evidence of pericardial tamponade.  -Cardio consult: Dr. Velasco.   - Post-stress resting myocardial perfusion gated SPECT  imaging was performed (LVEF > 70%) with no regional wall  motion abnormalities.     DM (diabetes mellitus).  Plan: FS: 110  -Patient take metformin, insulin and glimepride at home  -Will start HSS, c/w accucheck  -f/u HbA1C : 8  - Patient will be discharged on home meds ; increase the insulin at bed time to 20 units     R/O Anemia.  Plan: Hb10.9 MCV and RDW nl. Patient also takes cyanocobalamine injections.  f/u B12,Folic acid  f/u anemia panel.      IMPROVE score: 2 will start lovenox. 77 year old Female from home, ambulates independently, (daughter at beside interpreting) with PMH of diabetes, HLD??(patient stopped taking simvastatin in 2016) , osteoporosis??( patient takes alendronate but does not know why?) came with c/o Rt, shoulder pain, Chest pain and SOB x yesterday. As per the patient, She was coming out from car to go to her home when she suddenly developed Rt shoulder pain which then radiated to her chest and back of neck. Pain was sudden in onset, 9/10 in intensity, rodolfo in nature, with no aggravating/ alleviating factors, no changes with movement, associated with SOB, coughing, nausea, vomited 2 times which contains yellow fluid. She denies dizziness, diaphoresis, trauma, anxiety, stress, nasal congestion, hx of URI, orthopnea, Pedal edema, palpitations, heartburn, weight loss, abdominal pain, urinary or Bowel complains. She then went to her PCP Dr. Mau Horton (ph # [phone]) who referred the patient to hospital.  Pain is relieved in ED after she got reglan.  Patient also denies hx of GERD or gastritis,     Patient was admitted to tele for atypical chest pain  to rule out acs .   EKG: NSR, NS-T wave changes, Troponin x 3 negative.,  Heart score: Moderate- 4, ELINA: 1 point( 5% all cause mortality risk), PE ruled out: CT angio normal, Partially visualized upper abdomen demonstrates markedly dilated common bile duct with intrahepatic bile duct dilatation. could be due to postcholecytectomy finding"  CXR normal  -Patient states she was on simvastatin till 2016 but her PCP asked to stop the medication  -will start aspirin, atorvastatin, not starting lopressor because of low BP  -f/u lipid profile : wnl    f/u Echo : Trace pericardial effusion measuring approximately 0.5  cm posterior to the left ventricle.   No echocardiographic  evidence of pericardial tamponade.  -Cardio consult: Dr. Velasco.   - Post-stress resting myocardial perfusion gated SPECT  imaging was performed (LVEF > 70%) with no regional wall  motion abnormalities.  Patient is medically stable and ready to be discharged on home meds. Baby asa was added owing to h/o DM - cardiovascular disease risk .     Patient has h/o  DM (diabetes mellitus).  Plan: FS: 110  -Patient takes metformin, insulin and glimepride at home.   -Was started on  HSS, c/w accucheck  -f/u HbA1C : 8  - Patient will be discharged on home meds ; increase the insulin at bed time to 20 units . c/w other home meds     R/O Anemia.  Plan: Hb10.9 MCV and RDW nl. Patient also takes cyanocobalamine injections.  Anemia profile wnl .   c/w home meds      Patient has IMPROVE score: 2 was started on lovenox.     Patient is medically stable and ready to be discharged on home meds. Baby asa was added owing to h/o DM - cardiovascular disease risk .

## 2017-09-01 NOTE — PROGRESS NOTE ADULT - SUBJECTIVE AND OBJECTIVE BOX
Subjective:  pt seen and examined, ROS neg , no chest pain on exam    insulin lispro (HumaLOG) corrective regimen sliding scale   SubCutaneous three times a day before meals  enoxaparin Injectable 40 milliGRAM(s) SubCutaneous daily  atorvastatin 40 milliGRAM(s) Oral at bedtime  aspirin  chewable 81 milliGRAM(s) Oral daily                            10.9   6.7   )-----------( 204      ( 30 Aug 2017 17:28 )             34.2       Hemoglobin: 10.9 g/dL (08-30 @ 17:28)      08-30    137  |  100  |  15  ----------------------------<  146<H>  4.0   |  32<H>  |  0.73    Ca    8.8      30 Aug 2017 17:28    TPro  6.4  /  Alb  2.4<L>  /  TBili  0.5  /  DBili  0.1  /  AST  16  /  ALT  13  /  AlkPhos  72  08-30    Creatinine Trend: 0.73<--    COAGS:     CARDIAC MARKERS ( 30 Aug 2017 23:20 )  <0.015 ng/mL / x     / 51 U/L / x     / <1.0 ng/mL  CARDIAC MARKERS ( 30 Aug 2017 20:48 )  <0.015 ng/mL / x     / x     / x     / x      CARDIAC MARKERS ( 30 Aug 2017 17:28 )  <0.015 ng/mL / x     / 55 U/L / x     / <1.0 ng/mL        T(C): 36.6 (09-01-17 @ 05:00), Max: 37.2 (08-31-17 @ 11:35)  HR: 68 (09-01-17 @ 05:00) (65 - 72)  BP: 111/36 (09-01-17 @ 05:00) (101/45 - 123/42)  RR: 18 (09-01-17 @ 05:00) (16 - 18)  SpO2: 96% (09-01-17 @ 05:00) (95% - 100%)  Wt(kg): --    I&O's Summary    31 Aug 2017 07:01  -  01 Sep 2017 06:34  --------------------------------------------------------  IN: 200 mL / OUT: 0 mL / NET: 200 mL      HEENT:   Normal oral mucosa, PERRL, EOMI	  Lymphatic: No obvious lymphadenopathy , no edema  Cardiovascular: Normal S1 S2, No JVD,  1/6 KAPIL murmur , Peripheral pulses palpable 2+ bilaterally  Respiratory: Lungs clear to auscultation, normal effort 	  Gastrointestinal:  Soft, Non-tender, + BS	  Skin: No rashes, No cyanosis, warm to touch  Musculoskeletal: Normal range of motion, normal strength  Psychiatry:  Appropriate Mood & affect     TELEMETRY: 	  nsr ,     DIAGNOSTIC TESTING:  [ ] Echocardiogram: < from: Transthoracic Echocardiogram (08.31.17 @ 08:46) >  CONCLUSIONS:  1. Mitral annular calcification. Trace mitral  regurgitation.  2. Normal left ventricular internal dimensions and wall  thicknesses.  3. Normal left ventricular function.  4. Normal right ventricular size and function.  5. Trace pericardial effusion measuring approximatelt 0.5  cm posterior to the left ventricle.   No echocardiographic  evidence of pericardial tamponade.    < end of copied text >    [ ]  Catheterization:  [ ] Stress Test:  < from: Nuclear Stress Test-Pharmacologic (08.31.17 @ 09:36) >  IMPRESSIONS:Normal Study  * Negative ECG evidence of ischemia after IV  administartion of Regadenoson.  * Myocardial Perfusion SPECT results are normal.  * No clear evidence of ischemia or infarct.  * Post-stress resting myocardial perfusion gated SPECT  imaging was performed (LVEF > 70%) with no regional wall  motion abnormalities.    < end of copied text >    OTHER: 	        ASSESSMENT/PLAN: 	77y Female  hx of DM, chol, osteoporosis , now chest pain / sob-- R/o MI    tele stable no arrythmia  echo with no s/s of tamponade.  asa, statin    GI / DVT prophylaxis.   keep K>4, mag >2.0   nst noted without any ischemia   cardiac markers neg x 3  BP stable   D/W Dr Velasco

## 2017-09-01 NOTE — DISCHARGE NOTE ADULT - NS AS DC STROKE ED MATERIALS
Prescribed Medications/Need for Followup After Discharge/Stroke Warning Signs and Symptoms/Call 911 for Stroke/Stroke Education Booklet/Risk Factors for Stroke

## 2017-09-01 NOTE — DISCHARGE NOTE ADULT - PATIENT PORTAL LINK FT
“You can access the [facility] Patient Portal, offered by [facility], by registering with the following website: [email or web address]”

## 2017-09-01 NOTE — DISCHARGE NOTE ADULT - CARE PLAN
Principal Discharge DX:	Atypical chest pain  Goal:	resolution of symptoms  Instructions for follow-up, activity and diet:	c/w asa   c/w home meds   Please c/w low ch diet   Follow up with pmd in 2 weeks  Secondary Diagnosis:	DM (diabetes mellitus)  Instructions for follow-up, activity and diet:	c/w home meds  c/w Increased dose to 20 at bedtime  Secondary Diagnosis:	Anemia  Instructions for follow-up, activity and diet:	c/w vitamin b 12 and folic acid Principal Discharge DX:	Atypical chest pain  Goal:	resolution of symptoms  Instructions for follow-up, activity and diet:	c/w asa   c/w home meds   Please c/w low ch diet   Follow up with pmd in 2 weeks  Secondary Diagnosis:	DM (diabetes mellitus)  Instructions for follow-up, activity and diet:	c/w home meds  c/w Increased dose to 20 at bedtime  a1c :8   Please be compliant with medications and diet and exercise   Follow up with pmd in 1 week  Please get check up with eye doctor (ophthalmologist ) and foot doctor ( podiatry ) - owing to the complications that can arise from diabetes  Secondary Diagnosis:	Anemia  Instructions for follow-up, activity and diet:	c/w vitamin b 12 and folic acid Principal Discharge DX:	Atypical chest pain  Goal:	resolution of symptoms  Instructions for follow-up, activity and diet:	c/w asa , statin (started after ascertaining the ascvd risk )  c/w home meds   Please c/w low ch diet   Follow up with pmd in 2 weeks  Secondary Diagnosis:	DM (diabetes mellitus)  Instructions for follow-up, activity and diet:	c/w home meds  c/w Increased dose to 15 at bedtime  a1c :8   Please be compliant with medications and diet and exercise   Follow up with pmd in 1 week  Please get check up with eye doctor (ophthalmologist ) and foot doctor ( podiatry ) - owing to the complications that can arise from diabetes  Secondary Diagnosis:	Anemia  Instructions for follow-up, activity and diet:	c/w vitamin b 12 and folic acid Principal Discharge DX:	Atypical chest pain  Goal:	resolution of symptoms  Instructions for follow-up, activity and diet:	You presented with chest pain and given risk factors for heart disease, you had a stress test which was negative for Ischemia. Echocardiogram did not show any significant valvular pathology showed small pericardial effusion. Given Diabetes, will recommend a baby Aspirin and your risk of heart disease based on ASCVD risk will benefit from Moderate intensity statin Lipitor 10 mg at bedtime) although your cholesterol is normal    c/w home meds   Please c/w low ch diet   Follow up with pmd in 2 weeks  Secondary Diagnosis:	DM (diabetes mellitus)  Goal:	Target HgbA1c around 7.0  Instructions for follow-up, activity and diet:	Your HgbA1c currently 8.0; As per patient previously 11;   Continue home dose of Glimepiride and Metformin; Increased insulin dose to 15 at bedtime  Follow jup with Accuchecks and PCP as outpatient.  Please be compliant with medications and diet and exercise   Please get check up with eye doctor (ophthalmologist ) and foot doctor ( podiatry ) - owing to the complications that can arise from diabetes  Secondary Diagnosis:	Anemia  Goal:	Keep hgb more than 12 gm.  Instructions for follow-up, activity and diet:	c/w vitamin b 12 and folic acid Principal Discharge DX:	Atypical chest pain  Goal:	resolution of symptoms  Instructions for follow-up, activity and diet:	You presented with chest pain and given risk factors for heart disease, you had a stress test which was negative for Ischemia. Echocardiogram did not show any significant valvular pathology showed small pericardial effusion. Given Diabetes, will recommend a baby Aspirin and your risk of heart disease based on ASCVD risk will benefit from Moderate intensity statin Lipitor 10 mg at bedtime) although your cholesterol is normal    c/w home meds   Please c/w low ch diet   Follow up with pmd in 2 weeks  Secondary Diagnosis:	DM (diabetes mellitus)  Goal:	Target HgbA1c around 7.0  Instructions for follow-up, activity and diet:	Your HgbA1c currently 8.0; As per patient previously 11;   Continue home dose of Glimepiride and Metformin; Increased insulin dose to 15 at bedtime  Follow jup with Accuchecks and PCP as outpatient.  Please be compliant with medications and diet and exercise   Please get check up with eye doctor (ophthalmologist ) and foot doctor ( podiatry ) - owing to the complications that can arise from diabetes  Secondary Diagnosis:	Anemia  Goal:	Keep hgb more than 12 gm.  Instructions for follow-up, activity and diet:	c/w vitamin b 12 and folic acid  Secondary Diagnosis:	Dilated bile duct  Instructions for follow-up, activity and diet:	Likely changes post removal of Gall bladder. You do not have any symptoms at this time. Principal Discharge DX:	Atypical chest pain  Goal:	resolution of symptoms  Instructions for follow-up, activity and diet:	You presented with chest pain and given risk factors for heart disease, you had a stress test which was negative for Ischemia. Echocardiogram did not show any significant valvular pathology showed small pericardial effusion. Given Diabetes, will recommend a baby Aspirin and your risk of heart disease based on ASCVD risk will benefit from Moderate intensity statin Lipitor 10 mg at bedtime) although your cholesterol is normal    c/w home meds   Please c/w low ch diet   Follow up with pmd in 2 weeks  Secondary Diagnosis:	DM (diabetes mellitus)  Goal:	Target HgbA1c around 7.0  Instructions for follow-up, activity and diet:	Your HgbA1c currently 8.0; As per patient previously 11;   Continue home dose of Glimepiride and Metformin; Increased insulin dose to 15 at bedtime  Follow jup with Accuchecks and PCP as outpatient.  Please be compliant with medications and diet and exercise   Please get check up with eye doctor (ophthalmologist ) and foot doctor ( podiatry ) - owing to the complications that can arise from diabetes  Secondary Diagnosis:	Anemia  Goal:	Keep hgb more than 12 gm.  Instructions for follow-up, activity and diet:	c/w vitamin b 12 and folic acid  Secondary Diagnosis:	Dilated bile duct  Instructions for follow-up, activity and diet:	Likely changes post removal of Gall bladder. You do not have any symptoms at this time.  Secondary Diagnosis:	Acid reflux  Instructions for follow-up, activity and diet:	Chest pain could be attribued to acid reflux; Advised to avoid spicy and oily food and eat dinner at least 2 hrs before sleep. If recurrent symptoms, may consider Omeprazole over the counter and if recurrent or persistent chest pain after trial of Omeprazole, may consult Gastroenterologist for Endoscopy.

## 2017-09-01 NOTE — PROGRESS NOTE ADULT - ATTENDING COMMENTS
Patient seen and examined, agree with above assessment and plan as transcribed above.    - No need for further cardiac testing  - D/C tele  - D/C per med  - f/u with us in office 1-2 weeks      Talat Velasco MD, [facility], [facility]  [address] [place] [address]  PHONE:  [phone]  BEEPER : [phone]
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
## 2017-09-06 DIAGNOSIS — Z80.7 FAMILY HISTORY OF OTHER MALIGNANT NEOPLASMS OF LYMPHOID, HEMATOPOIETIC AND RELATED TISSUES: ICD-10-CM

## 2017-09-06 DIAGNOSIS — M81.0 AGE-RELATED OSTEOPOROSIS WITHOUT CURRENT PATHOLOGICAL FRACTURE: ICD-10-CM

## 2017-09-06 DIAGNOSIS — Z79.84 LONG TERM (CURRENT) USE OF ORAL HYPOGLYCEMIC DRUGS: ICD-10-CM

## 2017-09-06 DIAGNOSIS — K91.5 POSTCHOLECYSTECTOMY SYNDROME: ICD-10-CM

## 2017-09-06 DIAGNOSIS — E11.65 TYPE 2 DIABETES MELLITUS WITH HYPERGLYCEMIA: ICD-10-CM

## 2017-09-06 DIAGNOSIS — R07.89 OTHER CHEST PAIN: ICD-10-CM

## 2017-09-06 DIAGNOSIS — T45.8X5A ADVERSE EFFECT OF OTHER PRIMARILY SYSTEMIC AND HEMATOLOGICAL AGENTS, INITIAL ENCOUNTER: ICD-10-CM

## 2017-09-06 DIAGNOSIS — K21.0 GASTRO-ESOPHAGEAL REFLUX DISEASE WITH ESOPHAGITIS: ICD-10-CM

## 2017-09-06 DIAGNOSIS — Z88.0 ALLERGY STATUS TO PENICILLIN: ICD-10-CM

## 2017-09-06 DIAGNOSIS — D64.9 ANEMIA, UNSPECIFIED: ICD-10-CM

## 2017-09-06 DIAGNOSIS — I51.7 CARDIOMEGALY: ICD-10-CM

## 2017-10-16 PROBLEM — E11.9 TYPE 2 DIABETES MELLITUS WITHOUT COMPLICATIONS: Chronic | Status: ACTIVE | Noted: 2017-08-30

## 2017-10-16 PROBLEM — Z00.00 ENCOUNTER FOR PREVENTIVE HEALTH EXAMINATION: Status: ACTIVE | Noted: 2017-10-16

## 2017-10-19 PROCEDURE — 93005 ELECTROCARDIOGRAM TRACING: CPT

## 2017-10-19 PROCEDURE — 80048 BASIC METABOLIC PNL TOTAL CA: CPT

## 2017-10-19 PROCEDURE — 93017 CV STRESS TEST TRACING ONLY: CPT

## 2017-10-19 PROCEDURE — 82607 VITAMIN B-12: CPT

## 2017-10-19 PROCEDURE — 82553 CREATINE MB FRACTION: CPT

## 2017-10-19 PROCEDURE — 83036 HEMOGLOBIN GLYCOSYLATED A1C: CPT

## 2017-10-19 PROCEDURE — 84100 ASSAY OF PHOSPHORUS: CPT

## 2017-10-19 PROCEDURE — 93306 TTE W/DOPPLER COMPLETE: CPT

## 2017-10-19 PROCEDURE — 85027 COMPLETE CBC AUTOMATED: CPT

## 2017-10-19 PROCEDURE — 80076 HEPATIC FUNCTION PANEL: CPT

## 2017-10-19 PROCEDURE — 99285 EMERGENCY DEPT VISIT HI MDM: CPT | Mod: 25

## 2017-10-19 PROCEDURE — 83550 IRON BINDING TEST: CPT

## 2017-10-19 PROCEDURE — 84466 ASSAY OF TRANSFERRIN: CPT

## 2017-10-19 PROCEDURE — 71275 CT ANGIOGRAPHY CHEST: CPT

## 2017-10-19 PROCEDURE — G0378: CPT

## 2017-10-19 PROCEDURE — 82728 ASSAY OF FERRITIN: CPT

## 2017-10-19 PROCEDURE — 78452 HT MUSCLE IMAGE SPECT MULT: CPT

## 2017-10-19 PROCEDURE — 80061 LIPID PANEL: CPT

## 2017-10-19 PROCEDURE — 84484 ASSAY OF TROPONIN QUANT: CPT

## 2017-10-19 PROCEDURE — 83735 ASSAY OF MAGNESIUM: CPT

## 2017-10-19 PROCEDURE — 71046 X-RAY EXAM CHEST 2 VIEWS: CPT

## 2017-10-19 PROCEDURE — 84443 ASSAY THYROID STIM HORMONE: CPT

## 2017-10-19 PROCEDURE — 82550 ASSAY OF CK (CPK): CPT

## 2017-10-19 PROCEDURE — A9502: CPT

## 2017-10-19 PROCEDURE — 76705 ECHO EXAM OF ABDOMEN: CPT

## 2017-11-02 ENCOUNTER — APPOINTMENT (OUTPATIENT)
Dept: GASTROENTEROLOGY | Facility: CLINIC | Age: 77
End: 2017-11-02
Payer: MEDICARE

## 2017-11-02 VITALS
TEMPERATURE: 98 F | SYSTOLIC BLOOD PRESSURE: 120 MMHG | OXYGEN SATURATION: 98 % | HEIGHT: 61 IN | HEART RATE: 76 BPM | WEIGHT: 132 LBS | BODY MASS INDEX: 24.92 KG/M2 | DIASTOLIC BLOOD PRESSURE: 60 MMHG

## 2017-11-02 PROCEDURE — 99203 OFFICE O/P NEW LOW 30 MIN: CPT

## 2017-11-02 RX ORDER — METFORMIN HYDROCHLORIDE 850 MG/1
850 TABLET, COATED ORAL TWICE DAILY
Refills: 0 | Status: ACTIVE | COMMUNITY

## 2017-11-02 RX ORDER — GLIMEPIRIDE 4 MG/1
4 TABLET ORAL ONCE
Refills: 0 | Status: ACTIVE | COMMUNITY

## 2017-11-02 RX ORDER — ALENDRONATE SODIUM 70 MG/1
70 TABLET ORAL WEEKLY
Refills: 0 | Status: ACTIVE | COMMUNITY

## 2017-11-03 LAB
ALBUMIN SERPL ELPH-MCNC: 4 G/DL
ALP BLD-CCNC: 85 U/L
ALT SERPL-CCNC: 16 U/L
ANION GAP SERPL CALC-SCNC: 16 MMOL/L
AST SERPL-CCNC: 18 U/L
BASOPHILS # BLD AUTO: 0.05 K/UL
BASOPHILS NFR BLD AUTO: 1.1 %
BILIRUB SERPL-MCNC: 0.2 MG/DL
BUN SERPL-MCNC: 17 MG/DL
CALCIUM SERPL-MCNC: 9.8 MG/DL
CHLORIDE SERPL-SCNC: 100 MMOL/L
CO2 SERPL-SCNC: 28 MMOL/L
CREAT SERPL-MCNC: 0.62 MG/DL
EOSINOPHIL # BLD AUTO: 0.12 K/UL
EOSINOPHIL NFR BLD AUTO: 2.5 %
GLUCOSE SERPL-MCNC: 76 MG/DL
HCT VFR BLD CALC: 36.3 %
HGB BLD-MCNC: 11.4 G/DL
IMM GRANULOCYTES NFR BLD AUTO: 0 %
LYMPHOCYTES # BLD AUTO: 1.47 K/UL
LYMPHOCYTES NFR BLD AUTO: 30.9 %
MAN DIFF?: NORMAL
MCHC RBC-ENTMCNC: 26.2 PG
MCHC RBC-ENTMCNC: 31.4 GM/DL
MCV RBC AUTO: 83.4 FL
MONOCYTES # BLD AUTO: 0.32 K/UL
MONOCYTES NFR BLD AUTO: 6.7 %
NEUTROPHILS # BLD AUTO: 2.8 K/UL
NEUTROPHILS NFR BLD AUTO: 58.8 %
PLATELET # BLD AUTO: 274 K/UL
POTASSIUM SERPL-SCNC: 5.4 MMOL/L
PROT SERPL-MCNC: 7.2 G/DL
RBC # BLD: 4.35 M/UL
RBC # FLD: 16 %
SODIUM SERPL-SCNC: 144 MMOL/L
WBC # FLD AUTO: 4.76 K/UL

## 2017-12-11 ENCOUNTER — OUTPATIENT (OUTPATIENT)
Dept: OUTPATIENT SERVICES | Facility: HOSPITAL | Age: 77
LOS: 1 days | End: 2017-12-11
Payer: COMMERCIAL

## 2017-12-11 ENCOUNTER — RESULT REVIEW (OUTPATIENT)
Age: 77
End: 2017-12-11

## 2017-12-11 DIAGNOSIS — Z90.49 ACQUIRED ABSENCE OF OTHER SPECIFIED PARTS OF DIGESTIVE TRACT: Chronic | ICD-10-CM

## 2017-12-11 DIAGNOSIS — D64.9 ANEMIA, UNSPECIFIED: ICD-10-CM

## 2017-12-11 PROCEDURE — 43239 EGD BIOPSY SINGLE/MULTIPLE: CPT

## 2017-12-11 PROCEDURE — 88305 TISSUE EXAM BY PATHOLOGIST: CPT

## 2017-12-11 PROCEDURE — 45380 COLONOSCOPY AND BIOPSY: CPT

## 2017-12-11 PROCEDURE — 88305 TISSUE EXAM BY PATHOLOGIST: CPT | Mod: 26

## 2017-12-11 PROCEDURE — 88312 SPECIAL STAINS GROUP 1: CPT

## 2017-12-11 PROCEDURE — 82962 GLUCOSE BLOOD TEST: CPT

## 2017-12-11 PROCEDURE — 88312 SPECIAL STAINS GROUP 1: CPT | Mod: 26

## 2018-01-09 ENCOUNTER — APPOINTMENT (OUTPATIENT)
Dept: GASTROENTEROLOGY | Facility: CLINIC | Age: 78
End: 2018-01-09
Payer: MEDICARE

## 2018-01-09 VITALS
HEART RATE: 76 BPM | WEIGHT: 133 LBS | HEIGHT: 61 IN | BODY MASS INDEX: 25.11 KG/M2 | SYSTOLIC BLOOD PRESSURE: 122 MMHG | RESPIRATION RATE: 16 BRPM | DIASTOLIC BLOOD PRESSURE: 74 MMHG | TEMPERATURE: 97.5 F

## 2018-01-09 PROCEDURE — 99213 OFFICE O/P EST LOW 20 MIN: CPT

## 2021-06-11 NOTE — ED PROVIDER NOTE - NEUROLOGICAL, MLM
Assessment:      Healthy male exam.    .  Encounter Diagnoses   Name Primary?     Asthma, mild intermittent Yes     Allergies      Hyperlipidemia      Asthma      Prostate cancer      Health care maintenance      Atypical mole          Plan:       All questions answered.    Patient Instructions   Fasting labs    Colonoscopy due Jan 2019    Prostate follow up at [place].   Discussed surgical options.    Prevnar 13    dermatology referral to eval and remove mole on abdomen           Subjective:      Basilio Goddard is a 59 y.o. male who presents for an annual exam. The patient reports that there is not domestic violence in his life.     Healthy Habits:   Regular Exercise: Yes  Sunscreen Use: Yes  Healthy Diet: Yes  Dental Visits Regularly: Yes  Seat Belt: Yes  Sexually active: Yes  Monthly Self Testicular Exams:  Yes  Hemoccults: N/A  Flex Sig: N/A  Colonoscopy: Yes; 1-19-09  Lipid Profile: Yes  Glucose Screen: Yes  Prevention of Osteoporosis: Yes  Last Dexa: N/A  Guns at Home:  No      Immunization History   Administered Date(s) Administered     DT (pediatric) 01/01/2000     Hep A, historic 02/27/2009, 06/02/2010     Hep B, Adult 08/31/2015     Hep B, historic 09/17/1993, 02/27/2009     IPV 08/19/1993     Influenza, inj, historic 10/04/2010     Influenza,seasonal quad, PF, 36+MOS 08/31/2015     MMR 10/15/1990, 08/19/1993     Pneumo Polysac 23-V 08/31/2015     Td, historic 02/27/2009     Tdap 02/27/2009     Typhoid, historic 08/19/1993     Immunization status: up to date and documented.    No exam data present    Current Outpatient Prescriptions   Medication Sig Dispense Refill     albuterol (PROVENTIL HFA;VENTOLIN HFA) 90 mcg/actuation inhaler Inhale 2 puffs every 6 (six) hours as needed for wheezing. 1 Inhaler 0     fluticasone (FLOVENT HFA) 220 mcg/actuation inhaler Inhale 2 puffs 2 (two) times a day before meals. 3 Inhaler 3     simvastatin (ZOCOR) 10 MG tablet TAKE ONE TABLET BY MOUTH NIGHTLY AT BEDTIME 90 tablet 0  "    No current facility-administered medications for this visit.      No past medical history on file.  No past surgical history on file.  Review of patient's allergies indicates no known allergies.  Family History   Problem Relation Age of Onset     Diabetes Mother      Stroke Father      Social History     Social History     Marital status:      Spouse name: N/A     Number of children: N/A     Years of education: N/A     Occupational History     Not on file.     Social History Main Topics     Smoking status: Never Smoker     Smokeless tobacco: Not on file     Alcohol use Not on file     Drug use: Not on file     Sexual activity: Not on file     Other Topics Concern     Not on file     Social History Narrative       Review of Systems  General:  Denies problem  Eyes: Denies problem  Ears/Nose/Throat: EARS RINGING  Cardiovascular: Denies problem  Respiratory:  Denies problem  Gastrointestinal:  Denies problem  Genitourinary: Denies problem  Musculoskeletal:  THUMB STIFFNESS AND OCCAS CLICKING;  LOWER BACK PAIN  Skin: Denies problem  Neurologic: Denies problem  Psychiatric: Denies problem  Endocrine: Denies problem  Heme/Lymphatic: Denies problem   Allergic/Immunologic: Denies problem        Objective:     Vitals:    06/14/17 0734   BP: 122/90   Pulse: 70   Resp: 16   Temp: 97.8  F (36.6  C)   TempSrc: Oral   Weight: 155 lb 1.6 oz (70.4 kg)   Height: 5' 5.75\" (1.67 m)     Body mass index is 25.22 kg/(m^2).    Physical  General Appearance: Alert, cooperative, no distress, appears stated age  Head: Normocephalic, without obvious abnormality, atraumatic  Eyes: PERRL, conjunctiva/corneas clear, EOM's intact  Ears: Normal TM's and external ear canals, both ears  Nose: Nares normal, septum midline,mucosa normal, no drainage  Throat: Lips, mucosa, and tongue normal; teeth and gums normal  Neck: Supple, symmetrical, trachea midline, no adenopathy;  thyroid: not enlarged, symmetric, no tenderness/mass/nodules; no " carotid bruit or JVD  Back: Symmetric, no curvature, ROM normal, no CVA tenderness  Lungs: Clear to auscultation bilaterally, respirations unlabored  Heart: Regular rate and rhythm, S1 and S2 normal, no murmur, rub, or gallop,  Abdomen: Soft, non-tender, bowel sounds active all four quadrants,  no masses, no organomegaly  Genitourinary: Penis normal. Right testis is descended. Left testis is descended.   Musculoskeletal: Normal range of motion. No joint swelling or deformity.   Extremities: Extremities normal, atraumatic, no cyanosis or edema  Skin: 1-2 mm slightly irregular dark black mole over cenntral abdomen  Lymph nodes: Cervical, supraclavicular, and axillary nodes normal  Neurologic: He is alert. He has normal reflexes.   Psychiatric: He has a normal mood and affect.        Lab Results   Component Value Date    PSA 5.47 (H) 08/28/2013     ACT 23     Alert and oriented, no focal deficits, no motor or sensory deficits.

## 2022-05-11 ENCOUNTER — EMERGENCY (EMERGENCY)
Facility: HOSPITAL | Age: 82
LOS: 1 days | Discharge: HOME | End: 2022-05-11
Attending: EMERGENCY MEDICINE
Payer: COMMERCIAL

## 2022-05-11 VITALS
DIASTOLIC BLOOD PRESSURE: 72 MMHG | OXYGEN SATURATION: 97 % | HEART RATE: 72 BPM | TEMPERATURE: 99 F | RESPIRATION RATE: 18 BRPM | SYSTOLIC BLOOD PRESSURE: 125 MMHG

## 2022-05-11 VITALS
SYSTOLIC BLOOD PRESSURE: 122 MMHG | HEART RATE: 70 BPM | TEMPERATURE: 98 F | HEIGHT: 60 IN | DIASTOLIC BLOOD PRESSURE: 52 MMHG | RESPIRATION RATE: 16 BRPM | WEIGHT: 126.99 LBS | OXYGEN SATURATION: 100 %

## 2022-05-11 DIAGNOSIS — Z90.49 ACQUIRED ABSENCE OF OTHER SPECIFIED PARTS OF DIGESTIVE TRACT: Chronic | ICD-10-CM

## 2022-05-11 LAB
ALBUMIN SERPL ELPH-MCNC: 3 G/DL — LOW (ref 3.5–5)
ALP SERPL-CCNC: 64 U/L — SIGNIFICANT CHANGE UP (ref 40–120)
ALT FLD-CCNC: 15 U/L DA — SIGNIFICANT CHANGE UP (ref 10–60)
ANION GAP SERPL CALC-SCNC: 9 MMOL/L — SIGNIFICANT CHANGE UP (ref 5–17)
APPEARANCE UR: CLEAR — SIGNIFICANT CHANGE UP
AST SERPL-CCNC: 18 U/L — SIGNIFICANT CHANGE UP (ref 10–40)
BASOPHILS # BLD AUTO: 0.03 K/UL — SIGNIFICANT CHANGE UP (ref 0–0.2)
BASOPHILS NFR BLD AUTO: 0.4 % — SIGNIFICANT CHANGE UP (ref 0–2)
BILIRUB SERPL-MCNC: 0.6 MG/DL — SIGNIFICANT CHANGE UP (ref 0.2–1.2)
BILIRUB UR-MCNC: NEGATIVE — SIGNIFICANT CHANGE UP
BUN SERPL-MCNC: 16 MG/DL — SIGNIFICANT CHANGE UP (ref 7–18)
CALCIUM SERPL-MCNC: 9.4 MG/DL — SIGNIFICANT CHANGE UP (ref 8.4–10.5)
CHLORIDE SERPL-SCNC: 100 MMOL/L — SIGNIFICANT CHANGE UP (ref 96–108)
CO2 SERPL-SCNC: 27 MMOL/L — SIGNIFICANT CHANGE UP (ref 22–31)
COLOR SPEC: YELLOW — SIGNIFICANT CHANGE UP
CREAT SERPL-MCNC: 0.78 MG/DL — SIGNIFICANT CHANGE UP (ref 0.5–1.3)
DIFF PNL FLD: NEGATIVE — SIGNIFICANT CHANGE UP
EGFR: 76 ML/MIN/1.73M2 — SIGNIFICANT CHANGE UP
EOSINOPHIL # BLD AUTO: 0.02 K/UL — SIGNIFICANT CHANGE UP (ref 0–0.5)
EOSINOPHIL NFR BLD AUTO: 0.3 % — SIGNIFICANT CHANGE UP (ref 0–6)
FLUAV AG NPH QL: SIGNIFICANT CHANGE UP
FLUBV AG NPH QL: SIGNIFICANT CHANGE UP
GLUCOSE SERPL-MCNC: 189 MG/DL — HIGH (ref 70–99)
GLUCOSE UR QL: NEGATIVE — SIGNIFICANT CHANGE UP
HCT VFR BLD CALC: 30 % — LOW (ref 34.5–45)
HGB BLD-MCNC: 9.7 G/DL — LOW (ref 11.5–15.5)
IMM GRANULOCYTES NFR BLD AUTO: 0.3 % — SIGNIFICANT CHANGE UP (ref 0–1.5)
KETONES UR-MCNC: NEGATIVE — SIGNIFICANT CHANGE UP
LEUKOCYTE ESTERASE UR-ACNC: NEGATIVE — SIGNIFICANT CHANGE UP
LYMPHOCYTES # BLD AUTO: 1.21 K/UL — SIGNIFICANT CHANGE UP (ref 1–3.3)
LYMPHOCYTES # BLD AUTO: 18.1 % — SIGNIFICANT CHANGE UP (ref 13–44)
MAGNESIUM SERPL-MCNC: 1.3 MG/DL — LOW (ref 1.6–2.6)
MCHC RBC-ENTMCNC: 27.9 PG — SIGNIFICANT CHANGE UP (ref 27–34)
MCHC RBC-ENTMCNC: 32.3 GM/DL — SIGNIFICANT CHANGE UP (ref 32–36)
MCV RBC AUTO: 86.2 FL — SIGNIFICANT CHANGE UP (ref 80–100)
MONOCYTES # BLD AUTO: 0.83 K/UL — SIGNIFICANT CHANGE UP (ref 0–0.9)
MONOCYTES NFR BLD AUTO: 12.4 % — SIGNIFICANT CHANGE UP (ref 2–14)
NEUTROPHILS # BLD AUTO: 4.58 K/UL — SIGNIFICANT CHANGE UP (ref 1.8–7.4)
NEUTROPHILS NFR BLD AUTO: 68.5 % — SIGNIFICANT CHANGE UP (ref 43–77)
NITRITE UR-MCNC: POSITIVE
NRBC # BLD: 0 /100 WBCS — SIGNIFICANT CHANGE UP (ref 0–0)
NT-PROBNP SERPL-SCNC: 355 PG/ML — SIGNIFICANT CHANGE UP (ref 0–450)
PH UR: 7 — SIGNIFICANT CHANGE UP (ref 5–8)
PLATELET # BLD AUTO: 185 K/UL — SIGNIFICANT CHANGE UP (ref 150–400)
POTASSIUM SERPL-MCNC: 4.1 MMOL/L — SIGNIFICANT CHANGE UP (ref 3.5–5.3)
POTASSIUM SERPL-SCNC: 4.1 MMOL/L — SIGNIFICANT CHANGE UP (ref 3.5–5.3)
PROT SERPL-MCNC: 7.2 G/DL — SIGNIFICANT CHANGE UP (ref 6–8.3)
PROT UR-MCNC: NEGATIVE — SIGNIFICANT CHANGE UP
RBC # BLD: 3.48 M/UL — LOW (ref 3.8–5.2)
RBC # FLD: 15.2 % — HIGH (ref 10.3–14.5)
SARS-COV-2 RNA SPEC QL NAA+PROBE: SIGNIFICANT CHANGE UP
SODIUM SERPL-SCNC: 136 MMOL/L — SIGNIFICANT CHANGE UP (ref 135–145)
SP GR SPEC: 1 — LOW (ref 1.01–1.02)
UROBILINOGEN FLD QL: NEGATIVE — SIGNIFICANT CHANGE UP
WBC # BLD: 6.69 K/UL — SIGNIFICANT CHANGE UP (ref 3.8–10.5)
WBC # FLD AUTO: 6.69 K/UL — SIGNIFICANT CHANGE UP (ref 3.8–10.5)

## 2022-05-11 PROCEDURE — 87637 SARSCOV2&INF A&B&RSV AMP PRB: CPT

## 2022-05-11 PROCEDURE — 83880 ASSAY OF NATRIURETIC PEPTIDE: CPT

## 2022-05-11 PROCEDURE — 87086 URINE CULTURE/COLONY COUNT: CPT

## 2022-05-11 PROCEDURE — 93970 EXTREMITY STUDY: CPT | Mod: 26

## 2022-05-11 PROCEDURE — 80053 COMPREHEN METABOLIC PANEL: CPT

## 2022-05-11 PROCEDURE — 87186 SC STD MICRODIL/AGAR DIL: CPT

## 2022-05-11 PROCEDURE — 72170 X-RAY EXAM OF PELVIS: CPT | Mod: 26

## 2022-05-11 PROCEDURE — 74176 CT ABD & PELVIS W/O CONTRAST: CPT | Mod: MA

## 2022-05-11 PROCEDURE — 81001 URINALYSIS AUTO W/SCOPE: CPT

## 2022-05-11 PROCEDURE — 74176 CT ABD & PELVIS W/O CONTRAST: CPT | Mod: 26,MA

## 2022-05-11 PROCEDURE — 83735 ASSAY OF MAGNESIUM: CPT

## 2022-05-11 PROCEDURE — 96374 THER/PROPH/DIAG INJ IV PUSH: CPT

## 2022-05-11 PROCEDURE — 72170 X-RAY EXAM OF PELVIS: CPT

## 2022-05-11 PROCEDURE — 36415 COLL VENOUS BLD VENIPUNCTURE: CPT

## 2022-05-11 PROCEDURE — 73562 X-RAY EXAM OF KNEE 3: CPT | Mod: 26,LT

## 2022-05-11 PROCEDURE — 99285 EMERGENCY DEPT VISIT HI MDM: CPT

## 2022-05-11 PROCEDURE — 99284 EMERGENCY DEPT VISIT MOD MDM: CPT | Mod: 25

## 2022-05-11 PROCEDURE — 93970 EXTREMITY STUDY: CPT

## 2022-05-11 PROCEDURE — 85025 COMPLETE CBC W/AUTO DIFF WBC: CPT

## 2022-05-11 PROCEDURE — 73562 X-RAY EXAM OF KNEE 3: CPT

## 2022-05-11 PROCEDURE — 96375 TX/PRO/DX INJ NEW DRUG ADDON: CPT

## 2022-05-11 RX ORDER — KETOROLAC TROMETHAMINE 30 MG/ML
15 SYRINGE (ML) INJECTION ONCE
Refills: 0 | Status: DISCONTINUED | OUTPATIENT
Start: 2022-05-11 | End: 2022-05-11

## 2022-05-11 RX ORDER — CEFPODOXIME PROXETIL 100 MG
1 TABLET ORAL
Qty: 20 | Refills: 0
Start: 2022-05-11 | End: 2022-05-20

## 2022-05-11 RX ORDER — CEFTRIAXONE 500 MG/1
1000 INJECTION, POWDER, FOR SOLUTION INTRAMUSCULAR; INTRAVENOUS ONCE
Refills: 0 | Status: COMPLETED | OUTPATIENT
Start: 2022-05-11 | End: 2022-05-11

## 2022-05-11 RX ORDER — MAGNESIUM SULFATE 500 MG/ML
2 VIAL (ML) INJECTION ONCE
Refills: 0 | Status: COMPLETED | OUTPATIENT
Start: 2022-05-11 | End: 2022-05-11

## 2022-05-11 RX ADMIN — Medication 15 MILLIGRAM(S): at 11:02

## 2022-05-11 RX ADMIN — Medication 50 GRAM(S): at 14:02

## 2022-05-11 RX ADMIN — CEFTRIAXONE 100 MILLIGRAM(S): 500 INJECTION, POWDER, FOR SOLUTION INTRAMUSCULAR; INTRAVENOUS at 14:02

## 2022-05-11 RX ADMIN — Medication 15 MILLIGRAM(S): at 11:30

## 2022-05-11 NOTE — ED ADULT NURSE NOTE - OBJECTIVE STATEMENT
Pt c/o generalized muscle pain x Saturday. Pt denies falls/trauma. No acute distress noted, denies chest pain, no shortness of breath indicated.

## 2022-05-11 NOTE — ED PROVIDER NOTE - NSFOLLOWUPINSTRUCTIONS_ED_ALL_ED_FT
Please follow up with your primary care doctor and orthopedist in 1-2 days.  Please use 600mg every 6 hours ibuprofen as needed for pain, or acetaminophen 650mg every 6 hours.  You may use cold packs to help with the pain.  Please return to the emergency department if you have worsening pain, numbness or weakness to your legs, difficulty urinating, abdominal pain, vomiting, fever, or any other symptoms.      Acute Low Back Pain    WHAT YOU NEED TO KNOW:    Acute low back pain is sudden discomfort that lasts up to 6 weeks and makes activity difficult.    DISCHARGE INSTRUCTIONS:    Return to the emergency department if:   •You have severe pain.  •You have sudden stiffness and heaviness on both buttocks down to both legs.  •You have numbness or weakness in one leg, or pain in both legs.  •You have numbness in your genital area or across your lower back.  •You cannot control your urine or bowel movements.    Call your doctor if:   •You have a fever.  •You have pain at night or when you rest.  •Your pain does not get better with treatment.  •You have pain that worsens when you cough or sneeze.  •You suddenly feel something pop or snap in your back.  •You have questions or concerns about your condition or care.    Medicines: You may need any of the following:  •NSAIDs, such as ibuprofen, help decrease swelling, pain, and fever. This medicine is available with or without a doctor's order. NSAIDs can cause stomach bleeding or kidney problems in certain people. If you take blood thinner medicine, always ask your healthcare provider if NSAIDs are safe for you. Always read the medicine label and follow directions.  •Acetaminophen decreases pain and fever. It is available without a doctor's order. Ask how much to take and how often to take it. Follow directions. Read the labels of all other medicines you are using to see if they also contain acetaminophen, or ask your doctor or pharmacist. Acetaminophen can cause liver damage if not taken correctly. Do not use more than 4 grams (4,000 milligrams) total of acetaminophen in one day.   •Muscle relaxers decrease pain by relaxing the muscles in your lower spine.  •Prescription pain medicine may be given. Ask your healthcare provider how to take this medicine safely. Some prescription pain medicines contain acetaminophen. Do not take other medicines that contain acetaminophen without talking to your healthcare provider. Too much acetaminophen may cause liver damage. Prescription pain medicine may cause constipation. Ask your healthcare provider how to prevent or treat constipation.     Self-care:   •Stay active as much as you can without causing more pain. Bed rest could make your back pain worse. Start with some light exercises, such as walking. Avoid heavy lifting until your pain is gone. Ask for more information about the activities or exercises that are right for you.  •Apply heat on your back for 20 to 30 minutes every 2 hours for as many days as directed. Heat helps decrease pain and muscle spasms. Alternate heat and ice.  •Apply ice on your back for 15 to 20 minutes every hour or as directed. Use an ice pack, or put crushed ice in a plastic bag. Cover it with a towel before you apply it to your skin. Ice helps prevent tissue damage and decreases swelling and pain.    Prevent acute low back pain:   •Use proper body mechanics. ?Bend at the hips and knees when you  objects. Do not bend from the waist. Use your leg muscles as you lift the load. Do not use your back. Keep the object close to your chest as you lift it. Try not to twist or lift anything above your waist.  ?Change your position often when you stand for long periods of time. Rest one foot on a small box or footrest, and then switch to the other foot often.  ?Try not to sit for long periods of time. When you do, sit in a straight-backed chair with your feet flat on the floor. Never reach, pull, or push while you are sitting.  •Do exercises that strengthen your back muscles. Warm up before you exercise. Ask your healthcare provider the best exercises for you.  •Maintain a healthy weight. Ask your healthcare provider what a healthy weight is for you. Ask him or her to help you create a weight loss plan if you are overweight.    Follow up with your doctor as directed: Return for a follow-up visit if you still have pain after 1 to 3 weeks of treatment. You may need to visit an orthopedist if your back pain lasts longer than 12 weeks. Write down your questions so you remember to ask them during your visits.

## 2022-05-11 NOTE — ED PROVIDER NOTE - PATIENT PORTAL LINK FT
You can access the FollowMyHealth Patient Portal offered by [facility] by registering at the following website: [email or web address]. By joining [facility]’s FollowMyHealth portal, you will also be able to view your health information using other applications (apps) compatible with our system.

## 2022-05-11 NOTE — ED PROVIDER NOTE - PHYSICAL EXAMINATION
Afebrile, hemodynamically stable, saturating well  NAD, well appearing, sitting comfortably in bed, no WOB, speaking full sentences  Head NCAT  EOMI grossly, anicteric  MMM  No JVD  RRR, nml S1/S2, no m/r/g  Lungs CTAB, no w/r/r  Abd soft, NT, ND, nml BS, no rebound or guarding, no CVAT  AAO, CN's 3-12 intact  Acute TTP of b/l posterior pelvis and L lower extremity meli knee and tibia  CLARK spontaneously, b/l 0.5+ pitting edema, no discoloration, 2+ symm DP pulse, nml warmth/color/sensation  Skin warm, well perfused, no rashes or hives

## 2022-05-11 NOTE — ED ADULT NURSE NOTE - NSIMPLEMENTINTERV_GEN_ALL_ED
Implemented All Fall Risk Interventions:  [place] to call system. Call bell, personal items and telephone within reach. Instruct patient to call for assistance. Room bathroom lighting operational. Non-slip footwear when patient is off stretcher. Physically safe environment: no spills, clutter or unnecessary equipment. Stretcher in lowest position, wheels locked, appropriate side rails in place. Provide visual cue, wrist band, yellow gown, etc. Monitor gait and stability. Monitor for mental status changes and reorient to person, place, and time. Review medications for side effects contributing to fall risk. Reinforce activity limits and safety measures with patient and family.

## 2022-05-11 NOTE — ED PROVIDER NOTE - CLINICAL SUMMARY MEDICAL DECISION MAKING FREE TEXT BOX
No abd pain/tenderness and LFT's unremarkable, with low suspicion for acute process. Character and appearance low suspicion for dissection and no murmur or pulse asymmetry. No SOB/CP or pulm edema and BNP WNL, with low suspicion for CHF. No LING/proteinuria. No trauma or e/o fx. Character, her acute pain with mvmt, c/w degenerative changes, which are confirmed on Xray and CT. Also noted UTI which may be contributing to joint aches but no CVAT and low suspicion for pyelo and no e/o stone on CT. Given Toradol with significant improvement and comfortable with discharge. Patient is well appearing, NAD, afebrile, hemodynamically stable. Any available tests and studies were discussed with patient and daughter. Discharged with instructions in further symptomatic care, return precautions, and need for PMD, ortho, PT f/u.

## 2022-05-11 NOTE — ED PROVIDER NOTE - OBJECTIVE STATEMENT
Declines , uses daughter at bedside.  82yoF with h/o DM on metformin, anxiety, remote cholecystectomy, presents with pain since Saturday to her b/l posterior hips intermittently one then the other or both, as well as L knee pain, also mild b/l LE swelling. Has been resting more and less active x 2 wks due to the pandemic and is usually very active. Also had appearance of lumps to her back which have resolved. Had similar episode 1 mth ago that resolved. Denies all other symptoms including urinary incontinence of other urinary symptoms, CP, SOB, cough, fever, actual weakness or numbness of the extrems, recent travel/COVID hx or vaccination. Had a rash to PCN many yrs ago but no airway involvement or SOB.

## 2022-09-29 ENCOUNTER — APPOINTMENT (OUTPATIENT)
Dept: GASTROENTEROLOGY | Facility: CLINIC | Age: 82
End: 2022-09-29

## 2022-09-29 VITALS
TEMPERATURE: 97.3 F | OXYGEN SATURATION: 97 % | HEIGHT: 61 IN | BODY MASS INDEX: 22.47 KG/M2 | SYSTOLIC BLOOD PRESSURE: 108 MMHG | DIASTOLIC BLOOD PRESSURE: 64 MMHG | HEART RATE: 62 BPM | WEIGHT: 119 LBS

## 2022-09-29 DIAGNOSIS — Z12.11 ENCOUNTER FOR SCREENING FOR MALIGNANT NEOPLASM OF COLON: ICD-10-CM

## 2022-09-29 DIAGNOSIS — Z01.818 ENCOUNTER FOR OTHER PREPROCEDURAL EXAMINATION: ICD-10-CM

## 2022-09-29 PROCEDURE — 99204 OFFICE O/P NEW MOD 45 MIN: CPT

## 2022-09-29 NOTE — PHYSICAL EXAM

## 2022-10-04 NOTE — ASSESSMENT
[FreeTextEntry1] : This is an 82 year old female here for an evaluation of anemia.\par Need to reassess the hiatal hernia size.  large HH may be the cause of the ZULLY\par \par My plan\par EGD and colonoscopy same day\par F/u with Dr. Brunner for capsule\par Plenvu\par Covid swab\par \par Risks, benefits, and alternatives of EGD and colonoscopy discussed at length with patient including but not limited to bleeding , perforation, anesthesia related complication, aspiration, etc. Patient expressed understanding.\par \par EGD/Colonoscopy for evaluation of polyps, tumors, nodules with +/- biopsy and or cauterization w/ and or w/o clipping. \par

## 2022-10-04 NOTE — HISTORY OF PRESENT ILLNESS
[FreeTextEntry1] : This is an 82 year old female here for an evaluation of anemia. PMH of ZULLY, appendectomy, hernandez, Dm. Brother with lymphoma. Patient denies any difficulty swallowing or reflux. NO N&V or abdominal pain. No blood or dark tarry stools. She follows Dr. Simon, hematologist, for ZULLY. She is on iron transfusions. Labs from 9/20/22 HH 10.3/32.6 MCV 89 . EGD from 2017 small hiatal hernia and gastritis. Colonoscopy from 2017 with tubular adenoma and diverticulosis . Weight stable. Never had a capsule study \par Smoke/Etoh: none

## 2022-10-24 ENCOUNTER — RESULT REVIEW (OUTPATIENT)
Age: 82
End: 2022-10-24

## 2022-10-24 ENCOUNTER — OUTPATIENT (OUTPATIENT)
Dept: OUTPATIENT SERVICES | Facility: HOSPITAL | Age: 82
LOS: 1 days | End: 2022-10-24
Payer: COMMERCIAL

## 2022-10-24 ENCOUNTER — APPOINTMENT (OUTPATIENT)
Dept: GASTROENTEROLOGY | Facility: HOSPITAL | Age: 82
End: 2022-10-24

## 2022-10-24 ENCOUNTER — TRANSCRIPTION ENCOUNTER (OUTPATIENT)
Age: 82
End: 2022-10-24

## 2022-10-24 VITALS
HEART RATE: 70 BPM | DIASTOLIC BLOOD PRESSURE: 49 MMHG | SYSTOLIC BLOOD PRESSURE: 119 MMHG | RESPIRATION RATE: 17 BRPM | OXYGEN SATURATION: 99 %

## 2022-10-24 VITALS
RESPIRATION RATE: 17 BRPM | OXYGEN SATURATION: 99 % | DIASTOLIC BLOOD PRESSURE: 58 MMHG | WEIGHT: 119.05 LBS | TEMPERATURE: 98 F | SYSTOLIC BLOOD PRESSURE: 122 MMHG | HEART RATE: 69 BPM | HEIGHT: 61 IN

## 2022-10-24 DIAGNOSIS — Z90.49 ACQUIRED ABSENCE OF OTHER SPECIFIED PARTS OF DIGESTIVE TRACT: Chronic | ICD-10-CM

## 2022-10-24 DIAGNOSIS — D64.9 ANEMIA, UNSPECIFIED: ICD-10-CM

## 2022-10-24 DIAGNOSIS — Z12.11 ENCOUNTER FOR SCREENING FOR MALIGNANT NEOPLASM OF COLON: ICD-10-CM

## 2022-10-24 LAB
GLUCOSE BLDC GLUCOMTR-MCNC: 121 MG/DL — HIGH (ref 70–99)
SARS-COV-2 N GENE NPH QL NAA+PROBE: NOT DETECTED

## 2022-10-24 PROCEDURE — 88312 SPECIAL STAINS GROUP 1: CPT | Mod: 26

## 2022-10-24 PROCEDURE — 88305 TISSUE EXAM BY PATHOLOGIST: CPT

## 2022-10-24 PROCEDURE — 82962 GLUCOSE BLOOD TEST: CPT

## 2022-10-24 PROCEDURE — 45378 DIAGNOSTIC COLONOSCOPY: CPT

## 2022-10-24 PROCEDURE — 88312 SPECIAL STAINS GROUP 1: CPT

## 2022-10-24 PROCEDURE — 43239 EGD BIOPSY SINGLE/MULTIPLE: CPT

## 2022-10-24 PROCEDURE — 88305 TISSUE EXAM BY PATHOLOGIST: CPT | Mod: 26

## 2022-10-24 DEVICE — ESOPHAGEAL BALLOON CATH CRE FIXED WIRE 10-11-12MM: Type: IMPLANTABLE DEVICE | Status: FUNCTIONAL

## 2022-10-24 DEVICE — ESOPHAGEAL BALLOON CATH CRE FIXED WIRE 8-9-10MM: Type: IMPLANTABLE DEVICE | Status: FUNCTIONAL

## 2022-10-24 DEVICE — ESOPHAGEAL BALLOON CATH CRE FIXED WIRE 6-7-8MM: Type: IMPLANTABLE DEVICE | Status: FUNCTIONAL

## 2022-10-24 RX ORDER — ONDANSETRON 8 MG/1
4 TABLET, FILM COATED ORAL ONCE
Refills: 0 | Status: DISCONTINUED | OUTPATIENT
Start: 2022-10-24 | End: 2022-11-07

## 2022-10-24 NOTE — ASU PATIENT PROFILE, ADULT - FALL HARM RISK - UNIVERSAL INTERVENTIONS
Bed in lowest position, wheels locked, appropriate side rails in place/Call bell, personal items and telephone in reach/Instruct patient to call for assistance before getting out of bed or chair/Non-slip footwear when patient is out of bed/[place] to call system/Physically safe environment - no spills, clutter or unnecessary equipment/Purposeful Proactive Rounding/Room/bathroom lighting operational, light cord in reach

## 2022-10-24 NOTE — ASU DISCHARGE PLAN (ADULT/PEDIATRIC) - NS MD DC FALL RISK RISK
For information on Fall & Injury Prevention, visit: [email or web address] OR  [email or web address] OR  https://www.cdc.gov/steadi/patient.html

## 2022-10-26 LAB — SURGICAL PATHOLOGY STUDY: SIGNIFICANT CHANGE UP

## 2022-11-15 ENCOUNTER — APPOINTMENT (OUTPATIENT)
Dept: GASTROENTEROLOGY | Facility: CLINIC | Age: 82
End: 2022-11-15

## 2022-11-15 VITALS
BODY MASS INDEX: 22.84 KG/M2 | TEMPERATURE: 97.6 F | OXYGEN SATURATION: 99 % | HEIGHT: 61 IN | HEART RATE: 59 BPM | DIASTOLIC BLOOD PRESSURE: 71 MMHG | SYSTOLIC BLOOD PRESSURE: 119 MMHG | WEIGHT: 121 LBS

## 2022-11-15 PROCEDURE — 99214 OFFICE O/P EST MOD 30 MIN: CPT

## 2022-11-15 RX ORDER — POLYETHYLENE GLYCOL 3350 17 G/17G
17 POWDER, FOR SOLUTION ORAL
Qty: 238 | Refills: 0 | Status: COMPLETED | COMMUNITY
Start: 2017-11-03 | End: 2022-11-15

## 2022-11-15 RX ORDER — POLYETHYLENE GLYCOL 3350 17 G/17G
17 POWDER, FOR SOLUTION ORAL
Qty: 238 | Refills: 0 | Status: DISCONTINUED | COMMUNITY
Start: 2017-11-02 | End: 2022-11-15

## 2022-11-15 RX ORDER — POLYETHYLENE GLYCOL 3350, SODIUM SULFATE, SODIUM CHLORIDE, POTASSIUM CHLORIDE, ASCORBIC ACID, SODIUM ASCORBATE 140-9-5.2G
140 KIT ORAL
Qty: 1 | Refills: 0 | Status: DISCONTINUED | COMMUNITY
Start: 2022-09-29 | End: 2022-11-15

## 2022-11-15 NOTE — HISTORY OF PRESENT ILLNESS
[FreeTextEntry1] : This is an 82 year old female here for an evaluation of anemia. PMH of ZULLY, appendectomy, hernandez, Dm. Brother with lymphoma. Patient denies any difficulty swallowing or reflux. NO N&V or abdominal pain. No blood or dark tarry stools. She follows Dr. Simon, hematologist, for ZULLY. She is on iron transfusions. Labs from 9/20/22 HH 10.3/32.6 MCV 89 . EGD from 2017 small hiatal hernia and gastritis. Colonoscopy from 2017 with tubular adenoma and diverticulosis . Weight stable. \par Never had a capsule study \par Smoke/Etoh: none \par \par \par \par From Dr Avalos for VCE

## 2022-11-30 ENCOUNTER — APPOINTMENT (OUTPATIENT)
Dept: GASTROENTEROLOGY | Facility: CLINIC | Age: 82
End: 2022-11-30

## 2022-11-30 PROCEDURE — 91110 GI TRC IMG INTRAL ESOPH-ILE: CPT

## 2022-12-15 ENCOUNTER — APPOINTMENT (OUTPATIENT)
Dept: GASTROENTEROLOGY | Facility: CLINIC | Age: 82
End: 2022-12-15

## 2022-12-15 VITALS
HEART RATE: 62 BPM | OXYGEN SATURATION: 98 % | DIASTOLIC BLOOD PRESSURE: 68 MMHG | WEIGHT: 115 LBS | TEMPERATURE: 97.4 F | SYSTOLIC BLOOD PRESSURE: 110 MMHG | HEIGHT: 61 IN | BODY MASS INDEX: 21.71 KG/M2

## 2022-12-15 DIAGNOSIS — D64.9 ANEMIA, UNSPECIFIED: ICD-10-CM

## 2022-12-15 PROCEDURE — 99213 OFFICE O/P EST LOW 20 MIN: CPT

## 2022-12-15 NOTE — PHYSICAL EXAM

## 2022-12-18 NOTE — ASSESSMENT
[FreeTextEntry1] : This is an 82 year old female here for a follow up of anemia. VCE with delayed gastric emptying. But patients symptoms do not correlate with VCE findings. \par \par My plan \par Offered to repeat VCE Vs GES but patient declined.Informed her if new symptoms arise she must see me in the office e.i N&V, early satiety or fullness. \par Ensure BID\par GI PRN

## 2024-04-24 NOTE — ED PROVIDER NOTE - NS ED MD DISPO DISCHARGE CCDA
----- Message from Norman Snowden DO sent at 4/24/2024  8:48 AM CDT -----  Notify patient:  1)  rechecked kidney labs a little worse vs recent Kati draw, still not \"bad\" but I think per discussion yesterday anyway she had not had much to eat/drink throughout the day, probably still needs to work on drinking more water routinely, and we'll follow w/other lab ahead of next visit.  2) liver/lytes/sugar/thyroid levels and lipid profile all in good shape.  3) uric acid and inflammatory marker for gout evaluation both normal -- her foot/numbness issue discussed in office is certainly related to lumbar spine and nerve-impingement process, as discussed; and current Allopurinol treatment remains appropriate w/present dosing.  4) non-fasting lab ahead of next visit, could even be done same day as appt if desired, CBC no diff, Bmet re: HTN/CKD.     Patient/Caregiver provided printed discharge information.

## 2025-05-30 NOTE — ASSESSMENT
Patient continues to have severe right hip pain. Bursitis injection given 4/9/25 has provided minimal relief. She is interested in trying PT an Intraarticular hip injection discussed at that visit.     \"Per office note \"I recommended physical therapy focusing on abductor and low back pain. 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 If this were to worsen, we would consider referral for an intra-articular corticosteroid injection in the future.\"     Ok to order?   [FreeTextEntry1] : This is an 82 year old female here for an evaluation of anemia.\par Need to reassess the hiatal hernia size.  large HH may be the cause of the ZULLY\par \par VCE \par \par  The risks benefits alternatives and complications of the procedure/s were explained to the patient at length. The patient was agreeable and we will proceed.\par \par \par I spent 40 minutes reviewing the patients records prior to arrival, with patient , and reviewing records after visit. All questions were answered.\par \par

## (undated) DEVICE — SNARE LOOP POLY DISP 30MM LOOP

## (undated) DEVICE — FORCEP BIOPSY 2.5MM DISP

## (undated) DEVICE — BITE BLOCK ADULT 20 X 27MM (GREEN)

## (undated) DEVICE — TUBING MEDI-VAC W MAXIGRIP CONNECTORS 1/4"X6'

## (undated) DEVICE — SOL INJ NS 0.9% 500ML 1-PORT

## (undated) DEVICE — RETRIEVER ROTH NET PLATINUM-UNIVERSAL

## (undated) DEVICE — ADAPTER ENDO CHNL SINGLE USE

## (undated) DEVICE — SENSOR O2 FINGER ADULT

## (undated) DEVICE — DRSG CURITY GAUZE SPONGE 4 X 4" 12-PLY

## (undated) DEVICE — CONTAINER FORMALIN 10% 20ML

## (undated) DEVICE — NDL INJ SCLERO INTERJECT 23G

## (undated) DEVICE — TUBING IV SET GRAVITY 3Y 100" MACRO

## (undated) DEVICE — LUBRICATING JELLY ONESHOT 1.25OZ

## (undated) DEVICE — STERIS DEFENDO 3-PIECE KIT (AIR/WATER, SUCTION & BIOPSY VALVES)

## (undated) DEVICE — TUBING CANNULA SALTER LABS NASAL ADULT 7FT

## (undated) DEVICE — KIT ENDO PROCEDURE CUST W/VLV

## (undated) DEVICE — MASK OXYGEN PANORAMIC

## (undated) DEVICE — VENODYNE/SCD SLEEVE CALF MEDIUM

## (undated) DEVICE — SYR LUER LOK 50CC

## (undated) DEVICE — CLAMP BX HOT RAD JAW 3

## (undated) DEVICE — BIOPSY FORCEP RADIAL JAW 4 STANDARD WITH NEEDLE

## (undated) DEVICE — CATH ELCTR GLIDE PRB 7FR